# Patient Record
Sex: MALE | Race: WHITE | NOT HISPANIC OR LATINO | Employment: FULL TIME | URBAN - METROPOLITAN AREA
[De-identification: names, ages, dates, MRNs, and addresses within clinical notes are randomized per-mention and may not be internally consistent; named-entity substitution may affect disease eponyms.]

---

## 2017-09-28 ENCOUNTER — HOSPITAL ENCOUNTER (OUTPATIENT)
Dept: FAMILY MEDICINE CLINIC | Facility: CLINIC | Age: 57
Setting detail: SPECIMEN
Discharge: HOME | End: 2017-09-28
Attending: FAMILY MEDICINE | Admitting: FAMILY MEDICINE

## 2019-02-26 ENCOUNTER — HOSPITAL ENCOUNTER (OUTPATIENT)
Dept: GASTROENTEROLOGY | Facility: HOSPITAL | Age: 59
Setting detail: HOSPITAL OUTPATIENT SURGERY
Discharge: HOME | End: 2019-02-26
Attending: INTERNAL MEDICINE | Admitting: INTERNAL MEDICINE

## 2019-02-26 ENCOUNTER — ON CAMPUS - OUTPATIENT (OUTPATIENT)
Dept: URBAN - METROPOLITAN AREA HOSPITAL 85 | Facility: HOSPITAL | Age: 59
End: 2019-02-26
Payer: COMMERCIAL

## 2019-02-26 DIAGNOSIS — K44.9 DIAPHRAGMATIC HERNIA WITHOUT OBSTRUCTION OR GANGRENE: ICD-10-CM

## 2019-02-26 DIAGNOSIS — K57.30 DIVERTICULOSIS OF LARGE INTESTINE WITHOUT PERFORATION OR ABS: ICD-10-CM

## 2019-02-26 DIAGNOSIS — K52.9 NONINFECTIVE GASTROENTERITIS AND COLITIS, UNSPECIFIED: ICD-10-CM

## 2019-02-26 DIAGNOSIS — Z12.11 ENCOUNTER FOR SCREENING FOR MALIGNANT NEOPLASM OF COLON: ICD-10-CM

## 2019-02-26 DIAGNOSIS — D12.8 BENIGN NEOPLASM OF RECTUM: ICD-10-CM

## 2019-02-26 DIAGNOSIS — K22.2 ESOPHAGEAL OBSTRUCTION: ICD-10-CM

## 2019-02-26 DIAGNOSIS — K92.1 MELENA: ICD-10-CM

## 2019-02-26 DIAGNOSIS — K31.89 OTHER DISEASES OF STOMACH AND DUODENUM: ICD-10-CM

## 2019-02-26 DIAGNOSIS — K21.0 GASTRO-ESOPHAGEAL REFLUX DISEASE WITH ESOPHAGITIS: ICD-10-CM

## 2019-02-26 PROCEDURE — 43239 EGD BIOPSY SINGLE/MULTIPLE: CPT | Mod: 59 | Performed by: INTERNAL MEDICINE

## 2019-02-26 PROCEDURE — 45380 COLONOSCOPY AND BIOPSY: CPT | Mod: 33 | Performed by: INTERNAL MEDICINE

## 2019-06-27 RX ORDER — AMLODIPINE BESYLATE 5 MG/1
TABLET ORAL
Qty: 30 TABLET | Refills: 2 | Status: SHIPPED | OUTPATIENT
Start: 2019-06-27 | End: 2019-09-26 | Stop reason: SDUPTHER

## 2019-07-23 ENCOUNTER — OFFICE VISIT (OUTPATIENT)
Dept: FAMILY MEDICINE CLINIC | Facility: CLINIC | Age: 59
End: 2019-07-23

## 2019-07-23 VITALS
WEIGHT: 222.4 LBS | BODY MASS INDEX: 35.74 KG/M2 | OXYGEN SATURATION: 97 % | TEMPERATURE: 100.3 F | DIASTOLIC BLOOD PRESSURE: 83 MMHG | HEART RATE: 98 BPM | HEIGHT: 66 IN | SYSTOLIC BLOOD PRESSURE: 146 MMHG

## 2019-07-23 DIAGNOSIS — J06.9 ACUTE URI: Primary | ICD-10-CM

## 2019-07-23 DIAGNOSIS — I10 ESSENTIAL HYPERTENSION: ICD-10-CM

## 2019-07-23 PROCEDURE — 99213 OFFICE O/P EST LOW 20 MIN: CPT | Performed by: FAMILY MEDICINE

## 2019-07-23 RX ORDER — PRAVASTATIN SODIUM 40 MG
TABLET ORAL
COMMUNITY
Start: 2017-10-27 | End: 2019-08-01 | Stop reason: SDUPTHER

## 2019-07-23 RX ORDER — NEBIVOLOL 10 MG/1
10 TABLET ORAL DAILY
Qty: 30 TABLET | Refills: 5 | Status: SHIPPED | OUTPATIENT
Start: 2019-07-23 | End: 2019-08-07

## 2019-07-23 RX ORDER — DOXYCYCLINE 100 MG/1
100 TABLET ORAL 2 TIMES DAILY
Qty: 20 TABLET | Refills: 0 | Status: SHIPPED | OUTPATIENT
Start: 2019-07-23 | End: 2019-08-02

## 2019-07-23 RX ORDER — LISINOPRIL AND HYDROCHLOROTHIAZIDE 25; 20 MG/1; MG/1
TABLET ORAL EVERY 24 HOURS
COMMUNITY
Start: 2018-11-29 | End: 2019-07-23 | Stop reason: SINTOL

## 2019-07-23 NOTE — PROGRESS NOTES
"Subjective   Akira Barnett is a 59 y.o. male.     He is in with cough and congestion for the last 2 days.  He has felt the congestion in the head mostly and has tried over-the-counter products without success.  His cough has not been productive but it has been harsh.  He has some concern that at least partially the cough is related to his use of lisinopril.  His blood pressure has also not been well controlled.  As for the acute illness, he has had some low-grade fever in his throat is been sore.  He has had a normal appetite.  Bowel and bladder functions been normal.           /83 (BP Location: Left arm, Patient Position: Sitting, Cuff Size: Small Adult)   Pulse 98   Temp 100.3 °F (37.9 °C) (Oral)   Ht 167.6 cm (66\")   Wt 101 kg (222 lb 6.4 oz)   SpO2 97%   BMI 35.90 kg/m²       Chief Complaint   Patient presents with   • Cough     x2 days has tried Coricidin    • Sore Throat     x2 days has tried cepacol   • Nasal Congestion     x2 days            Current Outpatient Medications:   •  amLODIPine (NORVASC) 5 MG tablet, TAKE ONE TABLET BY MOUTH DAILY, Disp: 30 tablet, Rfl: 2  •  pravastatin (PRAVACHOL) 40 MG tablet, PRAVACHOL 40 MG TABS, Disp: , Rfl:   •  Chlorcyclizine-Pseudoephed (STAHIST AD) 25-60 MG tablet, Take 1 tablet by mouth 2 (Two) Times a Day As Needed (sinus pressure / congestion)., Disp: 42 tablet, Rfl: 0  •  doxycycline (ADOXA) 100 MG tablet, Take 1 tablet by mouth 2 (Two) Times a Day for 10 days., Disp: 20 tablet, Rfl: 0  •  nebivolol (BYSTOLIC) 10 MG tablet, Take 1 tablet by mouth Daily., Disp: 30 tablet, Rfl: 5        The following portions of the patient's history were reviewed and updated as appropriate: allergies, current medications, past family history, past medical history, past social history, past surgical history and problem list.    Review of Systems   Constitutional: Positive for fever. Negative for activity change, appetite change, chills and fatigue.   HENT: Positive for " postnasal drip, rhinorrhea and sore throat. Negative for congestion, sinus pressure, sinus pain, trouble swallowing and voice change.    Eyes: Negative for visual disturbance.   Respiratory: Positive for cough. Negative for chest tightness, shortness of breath and wheezing.    Cardiovascular: Negative for chest pain.   Gastrointestinal: Negative for abdominal distention, abdominal pain, constipation, diarrhea, nausea and vomiting.   Genitourinary: Negative for difficulty urinating, dysuria, frequency and urgency.   Musculoskeletal: Negative for back pain and neck pain.   Neurological: Negative for dizziness.   Psychiatric/Behavioral: Negative for agitation, hallucinations and suicidal ideas.       Objective   Physical Exam   HENT:   Mouth/Throat: Oropharyngeal exudate present.   Eyes: Conjunctivae are normal. Pupils are equal, round, and reactive to light.   Neck: Normal range of motion. Neck supple. No thyromegaly present.   Cardiovascular: Normal rate and regular rhythm.   No murmur heard.  Pulmonary/Chest: Effort normal and breath sounds normal. He has no wheezes. He has no rales.   Lymphadenopathy:     He has no cervical adenopathy.   Nursing note and vitals reviewed.        Assessment/Plan   Problems Addressed this Visit     None      Visit Diagnoses     Acute URI    -  Primary    Relevant Medications    doxycycline (ADOXA) 100 MG tablet    Essential hypertension        Relevant Medications    nebivolol (BYSTOLIC) 10 MG tablet      I will treat his upper respiratory symptoms.  I will change his blood pressure medication to Bystolic 10 mg daily to eliminate cough.  I will have him come back in a couple of weeks as a walk-in for blood pressure check.

## 2019-08-01 RX ORDER — PRAVASTATIN SODIUM 40 MG
TABLET ORAL
Qty: 30 TABLET | Refills: 2 | Status: SHIPPED | OUTPATIENT
Start: 2019-08-01 | End: 2019-10-31 | Stop reason: SDUPTHER

## 2019-08-07 ENCOUNTER — CLINICAL SUPPORT (OUTPATIENT)
Dept: FAMILY MEDICINE CLINIC | Facility: CLINIC | Age: 59
End: 2019-08-07

## 2019-08-07 VITALS — SYSTOLIC BLOOD PRESSURE: 148 MMHG | OXYGEN SATURATION: 95 % | HEART RATE: 70 BPM | DIASTOLIC BLOOD PRESSURE: 83 MMHG

## 2019-08-07 RX ORDER — METOPROLOL SUCCINATE 50 MG/1
50 TABLET, EXTENDED RELEASE ORAL DAILY
Qty: 30 TABLET | Refills: 3 | Status: SHIPPED | OUTPATIENT
Start: 2019-08-07 | End: 2019-12-11 | Stop reason: SDUPTHER

## 2019-08-07 NOTE — PROGRESS NOTES
Pt is here for a bp check and the bystolic with the coupon is $74.00 and without the coupon is $147.00 . Pt cannot affaord this and his bp is still running high . Will you change his bp med ?

## 2019-09-26 RX ORDER — AMLODIPINE BESYLATE 5 MG/1
TABLET ORAL
Qty: 30 TABLET | Refills: 1 | Status: SHIPPED | OUTPATIENT
Start: 2019-09-26 | End: 2019-12-11 | Stop reason: SDUPTHER

## 2019-10-31 RX ORDER — PRAVASTATIN SODIUM 40 MG
TABLET ORAL
Qty: 30 TABLET | Refills: 1 | Status: SHIPPED | OUTPATIENT
Start: 2019-10-31 | End: 2020-01-01

## 2019-12-11 RX ORDER — AMLODIPINE BESYLATE 5 MG/1
TABLET ORAL
Qty: 30 TABLET | Refills: 2 | Status: SHIPPED | OUTPATIENT
Start: 2019-12-11 | End: 2020-03-01

## 2019-12-11 RX ORDER — METOPROLOL SUCCINATE 50 MG/1
TABLET, EXTENDED RELEASE ORAL
Qty: 30 TABLET | Refills: 2 | Status: SHIPPED | OUTPATIENT
Start: 2019-12-11 | End: 2020-03-01

## 2020-01-01 RX ORDER — PRAVASTATIN SODIUM 40 MG
TABLET ORAL
Qty: 30 TABLET | Refills: 3 | Status: SHIPPED | OUTPATIENT
Start: 2020-01-01 | End: 2020-05-06

## 2020-03-01 RX ORDER — METOPROLOL SUCCINATE 50 MG/1
TABLET, EXTENDED RELEASE ORAL
Qty: 30 TABLET | Refills: 1 | Status: SHIPPED | OUTPATIENT
Start: 2020-03-01 | End: 2020-05-06

## 2020-03-01 RX ORDER — AMLODIPINE BESYLATE 5 MG/1
TABLET ORAL
Qty: 30 TABLET | Refills: 1 | Status: SHIPPED | OUTPATIENT
Start: 2020-03-01 | End: 2020-05-06

## 2020-03-01 NOTE — TELEPHONE ENCOUNTER
Sent refills but he needs to make an appt to be seen this Summer   Was last seen in July, so July / Aug would be appropriate

## 2020-05-06 RX ORDER — METOPROLOL SUCCINATE 50 MG/1
TABLET, EXTENDED RELEASE ORAL
Qty: 30 TABLET | Refills: 0 | Status: SHIPPED | OUTPATIENT
Start: 2020-05-06 | End: 2020-06-07

## 2020-05-06 RX ORDER — AMLODIPINE BESYLATE 5 MG/1
TABLET ORAL
Qty: 30 TABLET | Refills: 0 | Status: SHIPPED | OUTPATIENT
Start: 2020-05-06 | End: 2020-06-07

## 2020-05-06 RX ORDER — PRAVASTATIN SODIUM 40 MG
TABLET ORAL
Qty: 30 TABLET | Refills: 2 | Status: SHIPPED | OUTPATIENT
Start: 2020-05-06 | End: 2020-08-02

## 2020-05-14 ENCOUNTER — OFFICE VISIT (OUTPATIENT)
Dept: FAMILY MEDICINE CLINIC | Facility: CLINIC | Age: 60
End: 2020-05-14

## 2020-05-14 VITALS
OXYGEN SATURATION: 97 % | DIASTOLIC BLOOD PRESSURE: 95 MMHG | HEART RATE: 88 BPM | WEIGHT: 231.2 LBS | TEMPERATURE: 97.3 F | HEIGHT: 66 IN | BODY MASS INDEX: 37.16 KG/M2 | SYSTOLIC BLOOD PRESSURE: 165 MMHG

## 2020-05-14 DIAGNOSIS — R09.81 NASAL CONGESTION: Primary | ICD-10-CM

## 2020-05-14 PROCEDURE — 99213 OFFICE O/P EST LOW 20 MIN: CPT | Performed by: NURSE PRACTITIONER

## 2020-05-14 RX ORDER — GUAIFENESIN 600 MG/1
1200 TABLET, EXTENDED RELEASE ORAL 2 TIMES DAILY PRN
Qty: 30 TABLET | Refills: 0 | OUTPATIENT
Start: 2020-05-14 | End: 2020-10-29

## 2020-05-14 RX ORDER — LORATADINE 10 MG/1
10 TABLET ORAL DAILY
COMMUNITY

## 2020-05-14 RX ORDER — FLUTICASONE PROPIONATE 50 MCG
2 SPRAY, SUSPENSION (ML) NASAL DAILY
Qty: 1 BOTTLE | Refills: 2 | Status: SHIPPED | OUTPATIENT
Start: 2020-05-14 | End: 2023-03-21 | Stop reason: SDUPTHER

## 2020-05-14 NOTE — PROGRESS NOTES
"Subjective   Akira Barnett is a 60 y.o. male.     Pt is here today with complaints of headache and sinus issues.  He has had a lot of stress recently.  His father passed away in October and he is helping to take care of his mother.  He left work today because his stomach bothered him this morning and his allergies were acting up.  Denies any abdominal pain at this time.  Denies nausea, vomiting, or diarrhea.  He has had a HA with some sinus pressure that started today.  He did not sleep well last night.  Has an occasional cough from the drainage.  Last week he took coricidin, which helped some.  Denies fever, chills, or body aches.  Denies loss of taste or smell.         The following portions of the patient's history were reviewed and updated as appropriate: allergies, current medications, past family history, past medical history, past social history, past surgical history and problem list.    Review of Systems   Constitutional: Negative for chills, fatigue and fever.   HENT: Positive for congestion, postnasal drip, sinus pressure and sneezing. Negative for ear pain and sore throat.    Respiratory: Positive for cough (occasional). Negative for chest tightness and shortness of breath.    Cardiovascular: Negative for chest pain and palpitations.   Gastrointestinal: Negative for abdominal pain, constipation, diarrhea, nausea and vomiting.   Musculoskeletal: Negative for myalgias.   Neurological: Positive for headache. Negative for dizziness.       Objective   /95 (BP Location: Left arm, Patient Position: Sitting, Cuff Size: Large Adult)   Pulse 88   Temp 97.3 °F (36.3 °C) (Temporal)   Ht 167.6 cm (66\")   Wt 105 kg (231 lb 3.2 oz)   SpO2 97%   BMI 37.32 kg/m²   Physical Exam   Constitutional: He is oriented to person, place, and time. He appears well-developed and well-nourished. No distress.   HENT:   Head: Normocephalic and atraumatic.   Right Ear: External ear normal. cerumen impaction is " present.  Left Ear: External ear normal.   Eyes: Pupils are equal, round, and reactive to light.   Cardiovascular: Normal rate, regular rhythm and normal heart sounds.   Pulmonary/Chest: Effort normal and breath sounds normal. No respiratory distress. He has no wheezes.   Neurological: He is alert and oriented to person, place, and time.   Skin: Skin is warm and dry.   Psychiatric: He has a normal mood and affect. His behavior is normal. Judgment and thought content normal.         Assessment/Plan     Diagnoses and all orders for this visit:    1. Nasal congestion (Primary)  Comments:  uppers to be allergies  flonase  mucinex  [place] develops [place] [facility] [place]  off work until monday  call if no improvement  Orders:  -     guaiFENesin (Mucinex) 600 MG 12 hr tablet; Take 2 tablets by mouth 2 (Two) Times a Day As Needed for Congestion.  Dispense: 30 tablet; Refill: 0  -     fluticasone (Flonase) 50 MCG/ACT nasal spray; 2 sprays into the nostril(s) as directed by provider Daily.  Dispense: 1 bottle; Refill: 2

## 2020-05-14 NOTE — PATIENT INSTRUCTIONS
Push water intake  Take mucinex as needed  Start using flonase nasal spray daily  Off work today and tomorrow.  May return to work on Monday if symptoms have subsided and no fever has developed.  If fever develops go to urgent care center in [place] to be tested.  Take tylenol or ibuprofen for fever or discomfort  Get plenty of rest  Call if no improvement or worsening symptoms

## 2020-06-07 RX ORDER — METOPROLOL SUCCINATE 50 MG/1
TABLET, EXTENDED RELEASE ORAL
Qty: 30 TABLET | Refills: 0 | Status: SHIPPED | OUTPATIENT
Start: 2020-06-07 | End: 2020-06-28

## 2020-06-07 RX ORDER — AMLODIPINE BESYLATE 5 MG/1
TABLET ORAL
Qty: 30 TABLET | Refills: 0 | Status: SHIPPED | OUTPATIENT
Start: 2020-06-07 | End: 2020-06-28

## 2020-06-08 ENCOUNTER — OFFICE VISIT (OUTPATIENT)
Dept: FAMILY MEDICINE CLINIC | Facility: CLINIC | Age: 60
End: 2020-06-08

## 2020-06-08 ENCOUNTER — LAB (OUTPATIENT)
Dept: FAMILY MEDICINE CLINIC | Facility: CLINIC | Age: 60
End: 2020-06-08

## 2020-06-08 VITALS
HEART RATE: 100 BPM | OXYGEN SATURATION: 97 % | TEMPERATURE: 96.9 F | SYSTOLIC BLOOD PRESSURE: 155 MMHG | DIASTOLIC BLOOD PRESSURE: 84 MMHG | WEIGHT: 229 LBS | BODY MASS INDEX: 36.96 KG/M2

## 2020-06-08 DIAGNOSIS — Z00.00 ENCOUNTER FOR WELL ADULT EXAM WITHOUT ABNORMAL FINDINGS: Primary | ICD-10-CM

## 2020-06-08 DIAGNOSIS — Z12.5 ENCOUNTER FOR SCREENING FOR MALIGNANT NEOPLASM OF PROSTATE: ICD-10-CM

## 2020-06-08 DIAGNOSIS — I10 ESSENTIAL HYPERTENSION: ICD-10-CM

## 2020-06-08 LAB
ALBUMIN SERPL-MCNC: 4.3 G/DL (ref 3.5–5.2)
ALBUMIN/GLOB SERPL: 1.7 G/DL
ALP SERPL-CCNC: 73 U/L (ref 39–117)
ALT SERPL W P-5'-P-CCNC: 30 U/L (ref 1–41)
ANION GAP SERPL CALCULATED.3IONS-SCNC: 11 MMOL/L (ref 5–15)
ARTICHOKE IGE QN: 93 MG/DL (ref 0–100)
AST SERPL-CCNC: 26 U/L (ref 1–40)
BILIRUB SERPL-MCNC: 0.3 MG/DL (ref 0.2–1.2)
BUN BLD-MCNC: 10 MG/DL (ref 8–23)
BUN/CREAT SERPL: 13 (ref 7–25)
CALCIUM SPEC-SCNC: 9 MG/DL (ref 8.6–10.5)
CHLORIDE SERPL-SCNC: 95 MMOL/L (ref 98–107)
CHOLEST SERPL-MCNC: 157 MG/DL (ref 0–200)
CO2 SERPL-SCNC: 26 MMOL/L (ref 22–29)
CREAT BLD-MCNC: 0.77 MG/DL (ref 0.76–1.27)
GFR SERPL CREATININE-BSD FRML MDRD: 103 ML/MIN/1.73
GLOBULIN UR ELPH-MCNC: 2.6 GM/DL
GLUCOSE BLD-MCNC: 104 MG/DL (ref 65–99)
HDLC SERPL-MCNC: 39 MG/DL (ref 40–60)
LDLC SERPL CALC-MCNC: ABNORMAL MG/DL
LDLC/HDLC SERPL: ABNORMAL {RATIO}
POTASSIUM BLD-SCNC: 3.8 MMOL/L (ref 3.5–5.2)
PROT SERPL-MCNC: 6.9 G/DL (ref 6–8.5)
PSA SERPL-MCNC: 0.16 NG/ML (ref 0–4)
SODIUM BLD-SCNC: 132 MMOL/L (ref 136–145)
TRIGL SERPL-MCNC: 484 MG/DL (ref 0–150)
VLDLC SERPL-MCNC: ABNORMAL MG/DL

## 2020-06-08 PROCEDURE — 80053 COMPREHEN METABOLIC PANEL: CPT | Performed by: FAMILY MEDICINE

## 2020-06-08 PROCEDURE — 99396 PREV VISIT EST AGE 40-64: CPT | Performed by: FAMILY MEDICINE

## 2020-06-08 PROCEDURE — 80061 LIPID PANEL: CPT | Performed by: FAMILY MEDICINE

## 2020-06-08 PROCEDURE — G0103 PSA SCREENING: HCPCS | Performed by: FAMILY MEDICINE

## 2020-06-08 PROCEDURE — 83721 ASSAY OF BLOOD LIPOPROTEIN: CPT | Performed by: FAMILY MEDICINE

## 2020-06-08 PROCEDURE — 36415 COLL VENOUS BLD VENIPUNCTURE: CPT | Performed by: FAMILY MEDICINE

## 2020-06-08 RX ORDER — ASPIRIN 81 MG/1
81 TABLET, CHEWABLE ORAL DAILY
COMMUNITY

## 2020-06-08 NOTE — PROGRESS NOTES
Subjective   Akira Barnett is a 60 y.o. male.     He is in today for his annual well visit.  He has been compliant with his medication up-to-date.  He has been trying to work on better diet.  He is not exercising as much as had like but he has started to do at least a little exercise.  He denies any chest pain or difficulty breathing.  He denies any issue with bowel or bladder function.  He denies any issue with sleep or mood.  He denies any issue with dizziness or lightheadedness.  He denies any fever or illness symptoms.         /84 (BP Location: Left arm, Patient Position: Sitting, Cuff Size: Adult)   Pulse 100   Temp 96.9 °F (36.1 °C) (Temporal)   Wt 104 kg (229 lb)   SpO2 97%   BMI 36.96 kg/m²       Chief Complaint   Patient presents with   • Annual Exam     last cpe 2017           Current Outpatient Medications:   •  amLODIPine (NORVASC) 5 MG tablet, TAKE ONE TABLET BY MOUTH DAILY, Disp: 30 tablet, Rfl: 0  •  aspirin 81 MG chewable tablet, Chew 81 mg Daily., Disp: , Rfl:   •  fluticasone (Flonase) 50 MCG/ACT nasal spray, 2 sprays into the nostril(s) as directed by provider Daily., Disp: 1 bottle, Rfl: 2  •  loratadine (CLARITIN) 10 MG tablet, Take 10 mg by mouth Daily., Disp: , Rfl:   •  metoprolol succinate XL (TOPROL-XL) 50 MG 24 hr tablet, TAKE ONE TABLET BY MOUTH DAILY, Disp: 30 tablet, Rfl: 0  •  pravastatin (PRAVACHOL) 40 MG tablet, TAKE ONE TABLET BY MOUTH EVERY NIGHT AT BEDTIME, Disp: 30 tablet, Rfl: 2  •  Chlorcyclizine-Pseudoephed (STAHIST AD) 25-60 MG tablet, Take 1 tablet by mouth 2 (Two) Times a Day As Needed (sinus pressure / congestion)., Disp: 42 tablet, Rfl: 0  •  guaiFENesin (Mucinex) 600 MG 12 hr tablet, Take 2 tablets by mouth 2 (Two) Times a Day As Needed for Congestion., Disp: 30 tablet, Rfl: 0        The following portions of the patient's history were reviewed and updated as appropriate: allergies, current medications, past family history, past medical history, past social  history, past surgical history and problem list.    Review of Systems   Constitutional: Negative for activity change, fatigue and fever.   HENT: Negative for congestion, sinus pressure, sinus pain, sore throat and trouble swallowing.    Eyes: Negative for visual disturbance.   Respiratory: Negative for chest tightness, shortness of breath and wheezing.    Cardiovascular: Negative for chest pain.   Gastrointestinal: Negative for abdominal distention, abdominal pain, constipation, diarrhea, nausea and vomiting.   Genitourinary: Negative for difficulty urinating and dysuria.   Musculoskeletal: Negative for back pain and neck pain.   Neurological: Negative for dizziness and weakness.   Psychiatric/Behavioral: Negative for agitation, hallucinations and suicidal ideas.       Objective   Physical Exam   Constitutional: He is oriented to person, place, and time. No distress.   HENT:   Mouth/Throat: No oropharyngeal exudate.   Neck: Normal range of motion. No thyromegaly present.   Cardiovascular: Normal rate, regular rhythm and normal heart sounds.   No murmur heard.  Pulmonary/Chest: Effort normal and breath sounds normal. He has no wheezes. He has no rales.   Abdominal: Soft. Bowel sounds are normal. He exhibits no mass. There is no guarding.   Genitourinary: Rectum normal and prostate normal.   Musculoskeletal: Normal range of motion. He exhibits no edema.   Lymphadenopathy:     He has no cervical adenopathy.   Neurological: He is alert and oriented to person, place, and time. He displays normal reflexes.   Skin: No rash noted.   Psychiatric: He has a normal mood and affect.   Nursing note and vitals reviewed.        Assessment/Plan   Problems Addressed this Visit     None      Visit Diagnoses     Encounter for well adult exam without abnormal findings    -  Primary    Essential hypertension        Relevant Orders    Comprehensive metabolic panel    Lipid panel    Encounter for screening for malignant neoplasm of  prostate        Relevant Orders    PSA SCREENING        At this time I will update appropriate labs  I have asked him to get a blood pressure cuff and monitor his readings at home, as I wonder if there is an element of whitecoat to his disease  I will adjust treatment as indicated by lab findings and his home readings  I have asked him to continue pushing on the diet and exercise changes and I will see him back no later than 6 months

## 2020-06-10 RX ORDER — ICOSAPENT ETHYL 1000 MG/1
2 CAPSULE ORAL 2 TIMES DAILY WITH MEALS
Qty: 120 CAPSULE | Refills: 3 | OUTPATIENT
Start: 2020-06-10 | End: 2022-07-07

## 2020-06-12 ENCOUNTER — TELEPHONE (OUTPATIENT)
Dept: FAMILY MEDICINE CLINIC | Facility: CLINIC | Age: 60
End: 2020-06-12

## 2020-06-12 PROBLEM — I10 HYPERTENSION: Status: ACTIVE | Noted: 2020-06-12

## 2020-06-12 NOTE — TELEPHONE ENCOUNTER
Prior Auth APPROVED for Vascepa 1 mg capsule.     Key: [id number]    PA Case ID: [id number]    Coverage Start Date: 06/12/2020 at 12:00am  Coverage End Date : 06/12/2021 at 12:00am

## 2020-06-15 ENCOUNTER — TELEPHONE (OUTPATIENT)
Dept: FAMILY MEDICINE CLINIC | Facility: CLINIC | Age: 60
End: 2020-06-15

## 2020-06-15 NOTE — TELEPHONE ENCOUNTER
With the PA for Vascepa its still $ 182.00 that he has to pay out of pocket. He is wondering if it just a one month medicine or is there something cheaper he can take?

## 2020-06-28 RX ORDER — AMLODIPINE BESYLATE 5 MG/1
TABLET ORAL
Qty: 30 TABLET | Refills: 0 | Status: SHIPPED | OUTPATIENT
Start: 2020-06-28 | End: 2020-08-02

## 2020-06-28 RX ORDER — METOPROLOL SUCCINATE 50 MG/1
TABLET, EXTENDED RELEASE ORAL
Qty: 30 TABLET | Refills: 0 | Status: SHIPPED | OUTPATIENT
Start: 2020-06-28 | End: 2020-08-02

## 2020-08-02 RX ORDER — AMLODIPINE BESYLATE 5 MG/1
TABLET ORAL
Qty: 30 TABLET | Refills: 3 | Status: SHIPPED | OUTPATIENT
Start: 2020-08-02 | End: 2020-12-10

## 2020-08-02 RX ORDER — PRAVASTATIN SODIUM 40 MG
TABLET ORAL
Qty: 30 TABLET | Refills: 3 | Status: SHIPPED | OUTPATIENT
Start: 2020-08-02 | End: 2020-12-10

## 2020-08-02 RX ORDER — METOPROLOL SUCCINATE 50 MG/1
TABLET, EXTENDED RELEASE ORAL
Qty: 30 TABLET | Refills: 3 | Status: SHIPPED | OUTPATIENT
Start: 2020-08-02 | End: 2020-12-10

## 2020-10-29 PROCEDURE — U0003 INFECTIOUS AGENT DETECTION BY NUCLEIC ACID (DNA OR RNA); SEVERE ACUTE RESPIRATORY SYNDROME CORONAVIRUS 2 (SARS-COV-2) (CORONAVIRUS DISEASE [COVID-19]), AMPLIFIED PROBE TECHNIQUE, MAKING USE OF HIGH THROUGHPUT TECHNOLOGIES AS DESCRIBED BY CMS-2020-01-R: HCPCS | Performed by: FAMILY MEDICINE

## 2020-12-08 ENCOUNTER — OFFICE VISIT (OUTPATIENT)
Dept: FAMILY MEDICINE CLINIC | Facility: CLINIC | Age: 60
End: 2020-12-08

## 2020-12-08 ENCOUNTER — LAB (OUTPATIENT)
Dept: FAMILY MEDICINE CLINIC | Facility: CLINIC | Age: 60
End: 2020-12-08

## 2020-12-08 VITALS
SYSTOLIC BLOOD PRESSURE: 150 MMHG | HEART RATE: 75 BPM | WEIGHT: 229 LBS | TEMPERATURE: 96.9 F | BODY MASS INDEX: 35.87 KG/M2 | OXYGEN SATURATION: 98 % | DIASTOLIC BLOOD PRESSURE: 76 MMHG

## 2020-12-08 DIAGNOSIS — I10 ESSENTIAL HYPERTENSION: Primary | ICD-10-CM

## 2020-12-08 DIAGNOSIS — E78.5 HYPERLIPIDEMIA, UNSPECIFIED HYPERLIPIDEMIA TYPE: ICD-10-CM

## 2020-12-08 LAB
ALBUMIN SERPL-MCNC: 4.3 G/DL (ref 3.5–5.2)
ALBUMIN/GLOB SERPL: 1.5 G/DL
ALP SERPL-CCNC: 73 U/L (ref 39–117)
ALT SERPL W P-5'-P-CCNC: 28 U/L (ref 1–41)
ANION GAP SERPL CALCULATED.3IONS-SCNC: 7.4 MMOL/L (ref 5–15)
AST SERPL-CCNC: 24 U/L (ref 1–40)
BILIRUB SERPL-MCNC: 0.3 MG/DL (ref 0–1.2)
BUN SERPL-MCNC: 14 MG/DL (ref 8–23)
BUN/CREAT SERPL: 20.3 (ref 7–25)
CALCIUM SPEC-SCNC: 9.1 MG/DL (ref 8.6–10.5)
CHLORIDE SERPL-SCNC: 96 MMOL/L (ref 98–107)
CHOLEST SERPL-MCNC: 175 MG/DL (ref 0–200)
CO2 SERPL-SCNC: 30.6 MMOL/L (ref 22–29)
CREAT SERPL-MCNC: 0.69 MG/DL (ref 0.76–1.27)
GFR SERPL CREATININE-BSD FRML MDRD: 117 ML/MIN/1.73
GLOBULIN UR ELPH-MCNC: 2.8 GM/DL
GLUCOSE SERPL-MCNC: 98 MG/DL (ref 65–99)
HDLC SERPL-MCNC: 46 MG/DL (ref 40–60)
LDLC SERPL CALC-MCNC: 74 MG/DL (ref 0–100)
LDLC/HDLC SERPL: 1.3 {RATIO}
POTASSIUM SERPL-SCNC: 4.5 MMOL/L (ref 3.5–5.2)
PROT SERPL-MCNC: 7.1 G/DL (ref 6–8.5)
SODIUM SERPL-SCNC: 134 MMOL/L (ref 136–145)
TRIGL SERPL-MCNC: 345 MG/DL (ref 0–150)
VLDLC SERPL-MCNC: 55 MG/DL (ref 5–40)

## 2020-12-08 PROCEDURE — 80061 LIPID PANEL: CPT | Performed by: FAMILY MEDICINE

## 2020-12-08 PROCEDURE — 36415 COLL VENOUS BLD VENIPUNCTURE: CPT | Performed by: FAMILY MEDICINE

## 2020-12-08 PROCEDURE — 80053 COMPREHEN METABOLIC PANEL: CPT | Performed by: FAMILY MEDICINE

## 2020-12-08 PROCEDURE — 99213 OFFICE O/P EST LOW 20 MIN: CPT | Performed by: FAMILY MEDICINE

## 2020-12-08 NOTE — PROGRESS NOTES
Subjective   Akira Barnett is a 60 y.o. male.     He is in today for follow-up on his high blood pressure and high cholesterol.  He has lost both of his parents in the last year and is a little bit of a low spot at the holidays as a result.  He denies any headaches or chest pain.  He denies any issue with bowel or bladder function.  He denies any dizziness or lightheadedness.  He denies any issue with sleep.  Mood is just low from the grief but he is handling it and still fully functional with all of his daily activities.         /76 (BP Location: Left arm, Patient Position: Sitting, Cuff Size: Large Adult)   Pulse 75   Temp 96.9 °F (36.1 °C) (Temporal)   Wt 104 kg (229 lb)   SpO2 98%   BMI 35.87 kg/m²       Chief Complaint   Patient presents with   • Hypertension     6 month f/u           Current Outpatient Medications:   •  amLODIPine (NORVASC) 5 MG tablet, TAKE ONE TABLET BY MOUTH DAILY, Disp: 30 tablet, Rfl: 3  •  aspirin 81 MG chewable tablet, Chew 81 mg Daily., Disp: , Rfl:   •  fluticasone (Flonase) 50 MCG/ACT nasal spray, 2 sprays into the nostril(s) as directed by provider Daily., Disp: 1 bottle, Rfl: 2  •  icosapent ethyl (Vascepa) 1 g capsule capsule, Take 2 g by mouth 2 (Two) Times a Day With Meals., Disp: 120 capsule, Rfl: 3  •  loratadine (CLARITIN) 10 MG tablet, Take 10 mg by mouth Daily., Disp: , Rfl:   •  metoprolol succinate XL (TOPROL-XL) 50 MG 24 hr tablet, TAKE ONE TABLET BY MOUTH DAILY, Disp: 30 tablet, Rfl: 3  •  pravastatin (PRAVACHOL) 40 MG tablet, TAKE ONE TABLET BY MOUTH EVERY NIGHT AT BEDTIME, Disp: 30 tablet, Rfl: 3        The following portions of the patient's history were reviewed and updated as appropriate: allergies, current medications, past family history, past medical history, past social history, past surgical history and problem list.    Review of Systems   Constitutional: Negative for activity change, fatigue and fever.   HENT: Negative for congestion, sinus  pressure, sinus pain, sore throat and trouble swallowing.    Eyes: Negative for visual disturbance.   Respiratory: Negative for chest tightness, shortness of breath and wheezing.    Cardiovascular: Negative for chest pain.   Gastrointestinal: Negative for abdominal distention, abdominal pain, constipation, diarrhea, nausea and vomiting.   Genitourinary: Negative for difficulty urinating and dysuria.   Musculoskeletal: Negative for back pain and neck pain.   Neurological: Negative for dizziness and weakness.   Psychiatric/Behavioral: Negative for agitation, hallucinations and suicidal ideas.       Objective   Physical Exam  Vitals signs and nursing note reviewed.   Constitutional:       Appearance: Normal appearance.   Eyes:      Conjunctiva/sclera: Conjunctivae normal.      Pupils: Pupils are equal, round, and reactive to light.   Neck:      Musculoskeletal: Neck supple.   Cardiovascular:      Rate and Rhythm: Normal rate and regular rhythm.      Heart sounds: Normal heart sounds. No murmur.   Pulmonary:      Effort: Pulmonary effort is normal.      Breath sounds: Normal breath sounds. No wheezing or rales.   Abdominal:      General: Bowel sounds are normal.      Palpations: Abdomen is soft.      Tenderness: There is no abdominal tenderness. There is no guarding.   Musculoskeletal: Normal range of motion.      Right lower leg: No edema.      Left lower leg: No edema.   Neurological:      General: No focal deficit present.      Mental Status: He is alert and oriented to person, place, and time.   Psychiatric:         Mood and Affect: Mood normal.           Assessment/Plan   Problems Addressed this Visit        Cardiovascular and Mediastinum    Hypertension - Primary    Relevant Orders    Comprehensive metabolic panel    Lipid panel    Hyperlipidemia    Relevant Orders    Comprehensive metabolic panel    Lipid panel      Diagnoses       Codes Comments    Essential hypertension    -  Primary ICD-10-CM: I10  ICD-9-CM:  401.9     Hyperlipidemia, unspecified hyperlipidemia type     ICD-10-CM: E78.5  ICD-9-CM: 272.4         I will update labs  I will maintain current treatment plan unless labs indicate changes needed  I will see him back in 6 months  He got a flu shot prior to the visit  He stated he would take Covid vaccine when offered

## 2020-12-10 RX ORDER — PRAVASTATIN SODIUM 40 MG
TABLET ORAL
Qty: 30 TABLET | Refills: 2 | Status: SHIPPED | OUTPATIENT
Start: 2020-12-10 | End: 2021-03-14

## 2020-12-10 RX ORDER — METOPROLOL SUCCINATE 50 MG/1
TABLET, EXTENDED RELEASE ORAL
Qty: 30 TABLET | Refills: 2 | Status: SHIPPED | OUTPATIENT
Start: 2020-12-10 | End: 2021-03-14

## 2020-12-10 RX ORDER — AMLODIPINE BESYLATE 5 MG/1
TABLET ORAL
Qty: 30 TABLET | Refills: 2 | Status: SHIPPED | OUTPATIENT
Start: 2020-12-10 | End: 2021-03-14

## 2021-03-14 RX ORDER — METOPROLOL SUCCINATE 50 MG/1
TABLET, EXTENDED RELEASE ORAL
Qty: 30 TABLET | Refills: 1 | Status: SHIPPED | OUTPATIENT
Start: 2021-03-14 | End: 2021-05-16

## 2021-03-14 RX ORDER — PRAVASTATIN SODIUM 40 MG
TABLET ORAL
Qty: 30 TABLET | Refills: 1 | Status: SHIPPED | OUTPATIENT
Start: 2021-03-14 | End: 2021-05-16

## 2021-03-14 RX ORDER — AMLODIPINE BESYLATE 5 MG/1
TABLET ORAL
Qty: 30 TABLET | Refills: 1 | Status: SHIPPED | OUTPATIENT
Start: 2021-03-14 | End: 2021-05-16

## 2021-05-16 RX ORDER — AMLODIPINE BESYLATE 5 MG/1
TABLET ORAL
Qty: 30 TABLET | Refills: 0 | Status: SHIPPED | OUTPATIENT
Start: 2021-05-16 | End: 2021-06-13

## 2021-05-16 RX ORDER — PRAVASTATIN SODIUM 40 MG
TABLET ORAL
Qty: 30 TABLET | Refills: 0 | Status: SHIPPED | OUTPATIENT
Start: 2021-05-16 | End: 2021-06-13

## 2021-05-16 RX ORDER — METOPROLOL SUCCINATE 50 MG/1
TABLET, EXTENDED RELEASE ORAL
Qty: 30 TABLET | Refills: 0 | Status: SHIPPED | OUTPATIENT
Start: 2021-05-16 | End: 2021-06-13

## 2021-06-09 ENCOUNTER — OFFICE VISIT (OUTPATIENT)
Dept: FAMILY MEDICINE CLINIC | Facility: CLINIC | Age: 61
End: 2021-06-09

## 2021-06-09 VITALS
WEIGHT: 235 LBS | OXYGEN SATURATION: 98 % | DIASTOLIC BLOOD PRESSURE: 79 MMHG | TEMPERATURE: 98 F | SYSTOLIC BLOOD PRESSURE: 157 MMHG | BODY MASS INDEX: 36.81 KG/M2 | HEART RATE: 74 BPM

## 2021-06-09 DIAGNOSIS — Z12.5 ENCOUNTER FOR SCREENING FOR MALIGNANT NEOPLASM OF PROSTATE: ICD-10-CM

## 2021-06-09 DIAGNOSIS — E78.5 HYPERLIPIDEMIA, UNSPECIFIED HYPERLIPIDEMIA TYPE: ICD-10-CM

## 2021-06-09 DIAGNOSIS — I10 ESSENTIAL HYPERTENSION: Primary | ICD-10-CM

## 2021-06-09 PROCEDURE — 99214 OFFICE O/P EST MOD 30 MIN: CPT | Performed by: FAMILY MEDICINE

## 2021-06-09 RX ORDER — CHLORTHALIDONE 25 MG/1
25 TABLET ORAL DAILY
Qty: 90 TABLET | Refills: 1 | Status: SHIPPED | OUTPATIENT
Start: 2021-06-09 | End: 2022-01-19

## 2021-06-09 NOTE — PROGRESS NOTES
Subjective   Akira Barnett is a 61 y.o. male.     Akira Barnett is in for follow up on his high blood pressure and high cholesterol. There is no history of chest pain or dyspnea. There is no history of issue with bowel or bladder dysfunction. There is no history of dizziness or lightheadedness. There is no history of issue with sleep or mood. There is no history of issue with present medication. He has purchased a FitBit and is hoping to lose some weight. He is also working on better diet choices as well.           /79 (BP Location: Left arm, Patient Position: Sitting, Cuff Size: Large Adult)   Pulse 74   Temp 98 °F (36.7 °C) (Temporal)   Wt 107 kg (235 lb)   SpO2 98%   BMI 36.81 kg/m²       Chief Complaint   Patient presents with   • Hypertension     6 month f/u            Current Outpatient Medications:   •  amLODIPine (NORVASC) 5 MG tablet, TAKE ONE TABLET BY MOUTH DAILY, Disp: 30 tablet, Rfl: 0  •  aspirin 81 MG chewable tablet, Chew 81 mg Daily., Disp: , Rfl:   •  fluticasone (Flonase) 50 MCG/ACT nasal spray, 2 sprays into the nostril(s) as directed by provider Daily., Disp: 1 bottle, Rfl: 2  •  loratadine (CLARITIN) 10 MG tablet, Take 10 mg by mouth Daily., Disp: , Rfl:   •  metoprolol succinate XL (TOPROL-XL) 50 MG 24 hr tablet, TAKE ONE TABLET BY MOUTH DAILY, Disp: 30 tablet, Rfl: 0  •  pravastatin (PRAVACHOL) 40 MG tablet, TAKE ONE TABLET BY MOUTH EVERY NIGHT AT BEDTIME, Disp: 30 tablet, Rfl: 0  •  chlorthalidone (HYGROTON) 25 MG tablet, Take 1 tablet by mouth Daily., Disp: 90 tablet, Rfl: 1  •  icosapent ethyl (Vascepa) 1 g capsule capsule, Take 2 g by mouth 2 (Two) Times a Day With Meals., Disp: 120 capsule, Rfl: 3        The following portions of the patient's history were reviewed and updated as appropriate: allergies, current medications, past family history, past medical history, past social history, past surgical history, and problem list.    Review of Systems   Constitutional:  Negative for activity change, fatigue and fever.   HENT: Negative for congestion, sinus pressure, sinus pain, sore throat and trouble swallowing.    Eyes: Negative for visual disturbance.   Respiratory: Negative for chest tightness, shortness of breath and wheezing.    Cardiovascular: Negative for chest pain.   Gastrointestinal: Negative for abdominal distention, abdominal pain, constipation, diarrhea, nausea and vomiting.   Genitourinary: Negative for difficulty urinating and dysuria.   Musculoskeletal: Negative for back pain and neck pain.   Neurological: Negative for dizziness, weakness and numbness.   Psychiatric/Behavioral: Negative for agitation, hallucinations, sleep disturbance and suicidal ideas.       Objective   Physical Exam  Vitals and nursing note reviewed.   Constitutional:       Appearance: He is obese.   Eyes:      Conjunctiva/sclera: Conjunctivae normal.      Pupils: Pupils are equal, round, and reactive to light.   Cardiovascular:      Rate and Rhythm: Normal rate and regular rhythm.      Heart sounds: Normal heart sounds. No murmur heard.     Pulmonary:      Effort: Pulmonary effort is normal.      Breath sounds: No wheezing or rales.   Abdominal:      General: Bowel sounds are normal.      Palpations: Abdomen is soft.      Tenderness: There is no abdominal tenderness. There is no guarding.   Musculoskeletal:      Cervical back: Neck supple.      Right lower leg: No edema.      Left lower leg: No edema.   Lymphadenopathy:      Cervical: No cervical adenopathy.   Skin:     Findings: No rash.   Neurological:      General: No focal deficit present.      Mental Status: He is alert and oriented to person, place, and time.   Psychiatric:         Mood and Affect: Mood normal.           Assessment/Plan   Problems Addressed this Visit        Cardiac and Vasculature    Hypertension - Primary    Relevant Medications    chlorthalidone (HYGROTON) 25 MG tablet    Other Relevant Orders    Comprehensive  metabolic panel    Lipid panel    Hyperlipidemia    Relevant Orders    Comprehensive metabolic panel    Lipid panel      Other Visit Diagnoses     Encounter for screening for malignant neoplasm of prostate        Relevant Orders    PSA SCREENING      Diagnoses       Codes Comments    Essential hypertension    -  Primary ICD-10-CM: I10  ICD-9-CM: 401.9     Hyperlipidemia, unspecified hyperlipidemia type     ICD-10-CM: E78.5  ICD-9-CM: 272.4     Encounter for screening for malignant neoplasm of prostate     ICD-10-CM: Z12.5  ICD-9-CM: V76.44         I will add in some chlorthalidone for the blood pressure  We discussed some diet and exercise changes he needs to make  I will update labs and plan to see him back in 3 months  I will review the labs and see if adjustments are needed before the visit follow-up  He is to call for new concerns in the interim

## 2021-06-13 RX ORDER — PRAVASTATIN SODIUM 40 MG
TABLET ORAL
Qty: 30 TABLET | Refills: 3 | Status: SHIPPED | OUTPATIENT
Start: 2021-06-13 | End: 2021-10-28

## 2021-06-13 RX ORDER — METOPROLOL SUCCINATE 50 MG/1
TABLET, EXTENDED RELEASE ORAL
Qty: 30 TABLET | Refills: 3 | Status: SHIPPED | OUTPATIENT
Start: 2021-06-13 | End: 2021-10-28

## 2021-06-13 RX ORDER — AMLODIPINE BESYLATE 5 MG/1
TABLET ORAL
Qty: 30 TABLET | Refills: 3 | Status: SHIPPED | OUTPATIENT
Start: 2021-06-13 | End: 2021-10-28

## 2021-06-16 ENCOUNTER — LAB (OUTPATIENT)
Dept: FAMILY MEDICINE CLINIC | Facility: CLINIC | Age: 61
End: 2021-06-16

## 2021-06-16 LAB
ALBUMIN SERPL-MCNC: 4.5 G/DL (ref 3.5–5.2)
ALBUMIN/GLOB SERPL: 1.6 G/DL
ALP SERPL-CCNC: 70 U/L (ref 39–117)
ALT SERPL W P-5'-P-CCNC: 26 U/L (ref 1–41)
ANION GAP SERPL CALCULATED.3IONS-SCNC: 13.2 MMOL/L (ref 5–15)
AST SERPL-CCNC: 21 U/L (ref 1–40)
BILIRUB SERPL-MCNC: 0.5 MG/DL (ref 0–1.2)
BUN SERPL-MCNC: 12 MG/DL (ref 8–23)
BUN/CREAT SERPL: 14.6 (ref 7–25)
CALCIUM SPEC-SCNC: 9.3 MG/DL (ref 8.6–10.5)
CHLORIDE SERPL-SCNC: 88 MMOL/L (ref 98–107)
CHOLEST SERPL-MCNC: 145 MG/DL (ref 0–200)
CO2 SERPL-SCNC: 28.8 MMOL/L (ref 22–29)
CREAT SERPL-MCNC: 0.82 MG/DL (ref 0.76–1.27)
GFR SERPL CREATININE-BSD FRML MDRD: 96 ML/MIN/1.73
GLOBULIN UR ELPH-MCNC: 2.8 GM/DL
GLUCOSE SERPL-MCNC: 117 MG/DL (ref 65–99)
HDLC SERPL-MCNC: 42 MG/DL (ref 40–60)
LDLC SERPL CALC-MCNC: 76 MG/DL (ref 0–100)
LDLC/HDLC SERPL: 1.7 {RATIO}
POTASSIUM SERPL-SCNC: 3.1 MMOL/L (ref 3.5–5.2)
PROT SERPL-MCNC: 7.3 G/DL (ref 6–8.5)
SODIUM SERPL-SCNC: 130 MMOL/L (ref 136–145)
TRIGL SERPL-MCNC: 158 MG/DL (ref 0–150)
VLDLC SERPL-MCNC: 27 MG/DL (ref 5–40)

## 2021-06-16 PROCEDURE — 80053 COMPREHEN METABOLIC PANEL: CPT | Performed by: FAMILY MEDICINE

## 2021-06-16 PROCEDURE — 36415 COLL VENOUS BLD VENIPUNCTURE: CPT | Performed by: FAMILY MEDICINE

## 2021-06-16 PROCEDURE — 80061 LIPID PANEL: CPT | Performed by: FAMILY MEDICINE

## 2021-06-18 PROCEDURE — U0003 INFECTIOUS AGENT DETECTION BY NUCLEIC ACID (DNA OR RNA); SEVERE ACUTE RESPIRATORY SYNDROME CORONAVIRUS 2 (SARS-COV-2) (CORONAVIRUS DISEASE [COVID-19]), AMPLIFIED PROBE TECHNIQUE, MAKING USE OF HIGH THROUGHPUT TECHNOLOGIES AS DESCRIBED BY CMS-2020-01-R: HCPCS

## 2021-06-21 ENCOUNTER — OFFICE VISIT (OUTPATIENT)
Dept: FAMILY MEDICINE CLINIC | Facility: CLINIC | Age: 61
End: 2021-06-21

## 2021-06-21 VITALS
DIASTOLIC BLOOD PRESSURE: 81 MMHG | TEMPERATURE: 98 F | WEIGHT: 225.2 LBS | HEART RATE: 107 BPM | SYSTOLIC BLOOD PRESSURE: 121 MMHG | OXYGEN SATURATION: 93 % | BODY MASS INDEX: 35.27 KG/M2

## 2021-06-21 DIAGNOSIS — R50.9 FEBRILE ILLNESS, ACUTE: Primary | ICD-10-CM

## 2021-06-21 PROCEDURE — 99213 OFFICE O/P EST LOW 20 MIN: CPT | Performed by: FAMILY MEDICINE

## 2021-06-21 RX ORDER — CHLORCYCLIZINE HYDROCHLORIDE AND PSEUDOEPHEDRINE HYDROCHLORIDE 25; 60 MG/1; MG/1
0.5 TABLET ORAL 2 TIMES DAILY PRN
Qty: 30 TABLET | Refills: 0 | OUTPATIENT
Start: 2021-06-21 | End: 2022-07-07

## 2021-06-21 RX ORDER — AMOXICILLIN AND CLAVULANATE POTASSIUM 875; 125 MG/1; MG/1
1 TABLET, FILM COATED ORAL 2 TIMES DAILY
Qty: 20 TABLET | Refills: 0 | Status: SHIPPED | OUTPATIENT
Start: 2021-06-21 | End: 2021-07-01

## 2021-06-21 NOTE — PROGRESS NOTES
Subjective   Akira Barnett is a 61 y.o. male.     Akira Barnett is in for follow up on a recent urgent care visit where he was diagnosed with an acute febrile illness but was not sent home with any significant treatment.  He was tested for Covid and tested negative he states. There is no history of chest pain or dyspnea. There is no history of issue with bowel or bladder dysfunction. There is no history of dizziness or lightheadedness. There is no history of issue with sleep or mood. There is no history of issue with present medication.            /81 (BP Location: Left arm, Patient Position: Sitting, Cuff Size: Large Adult)   Pulse 107   Temp 98 °F (36.7 °C) (Oral)   Wt 102 kg (225 lb 3.2 oz)   SpO2 93%   BMI 35.27 kg/m²       Chief Complaint   Patient presents with   • Fever     every morning its 102.0 and he takes tyenlol to breaks it up - took it this morning- [facility] 06/18/21 - they did a covid test and it was negative - did not give him any medicine   • Headache     going on 5 days            Current Outpatient Medications:   •  amLODIPine (NORVASC) 5 MG tablet, TAKE ONE TABLET BY MOUTH DAILY, Disp: 30 tablet, Rfl: 3  •  aspirin 81 MG chewable tablet, Chew 81 mg Daily., Disp: , Rfl:   •  chlorthalidone (HYGROTON) 25 MG tablet, Take 1 tablet by mouth Daily., Disp: 90 tablet, Rfl: 1  •  fluticasone (Flonase) 50 MCG/ACT nasal spray, 2 sprays into the nostril(s) as directed by provider Daily., Disp: 1 bottle, Rfl: 2  •  icosapent ethyl (Vascepa) 1 g capsule capsule, Take 2 g by mouth 2 (Two) Times a Day With Meals., Disp: 120 capsule, Rfl: 3  •  loratadine (CLARITIN) 10 MG tablet, Take 10 mg by mouth Daily., Disp: , Rfl:   •  metoprolol succinate XL (TOPROL-XL) 50 MG 24 hr tablet, TAKE ONE TABLET BY MOUTH DAILY, Disp: 30 tablet, Rfl: 3  •  pravastatin (PRAVACHOL) 40 MG tablet, TAKE ONE TABLET BY MOUTH EVERY NIGHT AT BEDTIME, Disp: 30 tablet, Rfl: 3        The following portions of the  patient's history were reviewed and updated as appropriate: allergies, current medications, past family history, past medical history, past social history, past surgical history, and problem list.    Review of Systems   Constitutional: Positive for fever (102 on a few occasions). Negative for activity change and fatigue.   HENT: Positive for congestion. Negative for ear pain, sinus pressure, sinus pain, sore throat and trouble swallowing.    Eyes: Negative for visual disturbance.   Respiratory: Negative for chest tightness, shortness of breath and wheezing.    Cardiovascular: Negative for chest pain.   Gastrointestinal: Negative for abdominal distention, abdominal pain, constipation, diarrhea, nausea and vomiting.   Genitourinary: Negative for difficulty urinating and dysuria.   Musculoskeletal: Negative for back pain and neck pain.   Neurological: Positive for headaches.   Psychiatric/Behavioral: Negative for agitation, hallucinations and suicidal ideas.       Objective   Physical Exam  Vitals and nursing note reviewed.   Constitutional:       Appearance: He is ill-appearing.   HENT:      Right Ear: Tympanic membrane and ear canal normal.      Left Ear: Tympanic membrane and ear canal normal.      Nose: Congestion and rhinorrhea present.      Mouth/Throat:      Pharynx: Oropharynx is clear. No oropharyngeal exudate.   Eyes:      Conjunctiva/sclera: Conjunctivae normal.      Pupils: Pupils are equal, round, and reactive to light.   Cardiovascular:      Rate and Rhythm: Normal rate and regular rhythm.      Heart sounds: Normal heart sounds. No murmur heard.     Pulmonary:      Effort: Pulmonary effort is normal.      Breath sounds: No wheezing or rales.   Abdominal:      General: Bowel sounds are normal.      Palpations: Abdomen is soft.      Tenderness: There is no abdominal tenderness. There is no guarding.   Musculoskeletal:      Cervical back: Neck supple.   Lymphadenopathy:      Cervical: No cervical adenopathy.    Neurological:      Mental Status: He is alert.           Assessment/Plan   Problems Addressed this Visit     None      Visit Diagnoses     Febrile illness, acute    -  Primary      Diagnoses       Codes Comments    Febrile illness, acute    -  Primary ICD-10-CM: R50.9  ICD-9-CM: 780.60         I will see get some treatment for I think is a sinusitis  I will keep him home for couple of days to give him a chance to recuperate  He is to call me if not responding after about day 3 of treatment  He has had his Covid vaccination and now tested negative for Covid so that is not a concern

## 2021-06-23 ENCOUNTER — TELEPHONE (OUTPATIENT)
Dept: FAMILY MEDICINE CLINIC | Facility: CLINIC | Age: 61
End: 2021-06-23

## 2021-06-23 NOTE — TELEPHONE ENCOUNTER
The Stahist can cause some flushing  I do not think the antibiotic would have done anything like that  If he is not running fever, I think he can work, as long as his work will let him come to work  He has had the Covid vaccine and he tested negative for Covid

## 2021-06-23 NOTE — TELEPHONE ENCOUNTER
Caller: Akira Barnett    Relationship: Self    Best call back number: [phone] (H)  What medications are you currently taking:   Current Outpatient Medications on File Prior to Visit   Medication Sig Dispense Refill   • amLODIPine (NORVASC) 5 MG tablet TAKE ONE TABLET BY MOUTH DAILY 30 tablet 3   • amoxicillin-clavulanate (Augmentin) 875-125 MG per tablet Take 1 tablet by mouth 2 (Two) Times a Day for 10 days. 20 tablet 0   • aspirin 81 MG chewable tablet Chew 81 mg Daily.     • Chlorcyclizine-Pseudoephed (Stahist AD) 25-60 MG tablet Take 0.5 tablets by mouth 2 (Two) Times a Day As Needed (Congestion, headache). 30 tablet 0   • chlorthalidone (HYGROTON) 25 MG tablet Take 1 tablet by mouth Daily. 90 tablet 1   • fluticasone (Flonase) 50 MCG/ACT nasal spray 2 sprays into the nostril(s) as directed by provider Daily. 1 bottle 2   • icosapent ethyl (Vascepa) 1 g capsule capsule Take 2 g by mouth 2 (Two) Times a Day With Meals. 120 capsule 3   • loratadine (CLARITIN) 10 MG tablet Take 10 mg by mouth Daily.     • metoprolol succinate XL (TOPROL-XL) 50 MG 24 hr tablet TAKE ONE TABLET BY MOUTH DAILY 30 tablet 3   • pravastatin (PRAVACHOL) 40 MG tablet TAKE ONE TABLET BY MOUTH EVERY NIGHT AT BEDTIME 30 tablet 3     No current facility-administered medications on file prior to visit.        When did you start taking these medications: 6/21/21    Which medication are you concerned about: amoxicillin-clavulanate (Augmentin) 875-125 MG per tablet AND Chlorcyclizine-Pseudoephed (Stahist AD) 25-60 MG tablet    Who prescribed you this medication: MD JULIANA MERLOS    What are your concerns: PATIENT STATES HE WAS PRESCRIBED THESE MEDICATIONS. HIS WORK SENT HIM HOME TODAY  STATING THAT HE WAS VERY FLUSHED. PATIENT WANTS TO KNOW IF THE MEDICATIONS COULD HAVE SIDE EFFECTS. PATIENT DENIES ANY SHORTNESS OF BREATH, CHEST PAIN, NUMBNESS OR TINGLING. PATIENT STATES THAT HE DOES SEEM VERY JITTERY WITH THE MEDICATION. PLEASE  ADVISE.     How long have you been taking these medications:6/21/21    How long have you had these concerns: 6/22/21

## 2021-06-23 NOTE — TELEPHONE ENCOUNTER
Caller: Aikra Barnett    Relationship: Self    Best call back number: [phone]*    What is the best time to reach you: ANYTIME    Who are you requesting to speak with (clinical staff, provider,  specific staff member): BRODY DIEZ    Do you know the name of the person who called: BRODY    Do you require a callback: YES, PATIENT REQUESTING A CALLBACK.

## 2021-09-15 ENCOUNTER — OFFICE VISIT (OUTPATIENT)
Dept: FAMILY MEDICINE CLINIC | Facility: CLINIC | Age: 61
End: 2021-09-15

## 2021-09-15 VITALS
BODY MASS INDEX: 30.54 KG/M2 | DIASTOLIC BLOOD PRESSURE: 80 MMHG | HEART RATE: 75 BPM | WEIGHT: 195 LBS | OXYGEN SATURATION: 96 % | SYSTOLIC BLOOD PRESSURE: 133 MMHG

## 2021-09-15 DIAGNOSIS — E78.5 HYPERLIPIDEMIA, UNSPECIFIED HYPERLIPIDEMIA TYPE: ICD-10-CM

## 2021-09-15 DIAGNOSIS — I10 ESSENTIAL HYPERTENSION: Primary | ICD-10-CM

## 2021-09-15 PROCEDURE — 99213 OFFICE O/P EST LOW 20 MIN: CPT | Performed by: FAMILY MEDICINE

## 2021-10-28 RX ORDER — METOPROLOL SUCCINATE 50 MG/1
TABLET, EXTENDED RELEASE ORAL
Qty: 30 TABLET | Refills: 3 | Status: SHIPPED | OUTPATIENT
Start: 2021-10-28 | End: 2022-02-27

## 2021-10-28 RX ORDER — AMLODIPINE BESYLATE 5 MG/1
TABLET ORAL
Qty: 30 TABLET | Refills: 3 | Status: SHIPPED | OUTPATIENT
Start: 2021-10-28 | End: 2022-02-27

## 2021-10-28 RX ORDER — PRAVASTATIN SODIUM 40 MG
TABLET ORAL
Qty: 30 TABLET | Refills: 3 | Status: SHIPPED | OUTPATIENT
Start: 2021-10-28 | End: 2022-02-27

## 2022-01-19 RX ORDER — CHLORTHALIDONE 25 MG/1
TABLET ORAL
Qty: 30 TABLET | Refills: 2 | Status: SHIPPED | OUTPATIENT
Start: 2022-01-19 | End: 2022-03-16

## 2022-02-27 RX ORDER — PRAVASTATIN SODIUM 40 MG
TABLET ORAL
Qty: 30 TABLET | Refills: 3 | Status: SHIPPED | OUTPATIENT
Start: 2022-02-27 | End: 2022-06-25

## 2022-02-27 RX ORDER — AMLODIPINE BESYLATE 5 MG/1
TABLET ORAL
Qty: 30 TABLET | Refills: 3 | Status: SHIPPED | OUTPATIENT
Start: 2022-02-27 | End: 2022-06-25

## 2022-02-27 RX ORDER — METOPROLOL SUCCINATE 50 MG/1
TABLET, EXTENDED RELEASE ORAL
Qty: 30 TABLET | Refills: 3 | Status: SHIPPED | OUTPATIENT
Start: 2022-02-27 | End: 2022-06-25

## 2022-03-16 ENCOUNTER — OFFICE VISIT (OUTPATIENT)
Dept: FAMILY MEDICINE CLINIC | Facility: CLINIC | Age: 62
End: 2022-03-16

## 2022-03-16 ENCOUNTER — LAB (OUTPATIENT)
Dept: FAMILY MEDICINE CLINIC | Facility: CLINIC | Age: 62
End: 2022-03-16

## 2022-03-16 VITALS
SYSTOLIC BLOOD PRESSURE: 122 MMHG | BODY MASS INDEX: 30.38 KG/M2 | DIASTOLIC BLOOD PRESSURE: 70 MMHG | OXYGEN SATURATION: 96 % | TEMPERATURE: 98 F | WEIGHT: 194 LBS | HEART RATE: 78 BPM

## 2022-03-16 DIAGNOSIS — Z12.11 ENCOUNTER FOR SCREENING FOR MALIGNANT NEOPLASM OF COLON: ICD-10-CM

## 2022-03-16 DIAGNOSIS — Z12.5 ENCOUNTER FOR SCREENING FOR MALIGNANT NEOPLASM OF PROSTATE: ICD-10-CM

## 2022-03-16 DIAGNOSIS — I10 PRIMARY HYPERTENSION: Primary | ICD-10-CM

## 2022-03-16 DIAGNOSIS — E78.5 HYPERLIPIDEMIA, UNSPECIFIED HYPERLIPIDEMIA TYPE: ICD-10-CM

## 2022-03-16 LAB
ALBUMIN SERPL-MCNC: 4.7 G/DL (ref 3.5–5.2)
ALBUMIN/GLOB SERPL: 2 G/DL
ALP SERPL-CCNC: 71 U/L (ref 39–117)
ALT SERPL W P-5'-P-CCNC: 18 U/L (ref 1–41)
ANION GAP SERPL CALCULATED.3IONS-SCNC: 9.3 MMOL/L (ref 5–15)
AST SERPL-CCNC: 18 U/L (ref 1–40)
BILIRUB SERPL-MCNC: 0.3 MG/DL (ref 0–1.2)
BUN SERPL-MCNC: 17 MG/DL (ref 8–23)
BUN/CREAT SERPL: 20.7 (ref 7–25)
CALCIUM SPEC-SCNC: 9.6 MG/DL (ref 8.6–10.5)
CHLORIDE SERPL-SCNC: 100 MMOL/L (ref 98–107)
CHOLEST SERPL-MCNC: 174 MG/DL (ref 0–200)
CO2 SERPL-SCNC: 27.7 MMOL/L (ref 22–29)
CREAT SERPL-MCNC: 0.82 MG/DL (ref 0.76–1.27)
EGFRCR SERPLBLD CKD-EPI 2021: 99.9 ML/MIN/1.73
GLOBULIN UR ELPH-MCNC: 2.4 GM/DL
GLUCOSE SERPL-MCNC: 85 MG/DL (ref 65–99)
HDLC SERPL-MCNC: 41 MG/DL (ref 40–60)
LDLC SERPL CALC-MCNC: 93 MG/DL (ref 0–100)
LDLC/HDLC SERPL: 2.1 {RATIO}
POTASSIUM SERPL-SCNC: 4 MMOL/L (ref 3.5–5.2)
PROT SERPL-MCNC: 7.1 G/DL (ref 6–8.5)
PSA SERPL-MCNC: 0.26 NG/ML (ref 0–4)
SODIUM SERPL-SCNC: 137 MMOL/L (ref 136–145)
TRIGL SERPL-MCNC: 235 MG/DL (ref 0–150)
VLDLC SERPL-MCNC: 40 MG/DL (ref 5–40)

## 2022-03-16 PROCEDURE — 36415 COLL VENOUS BLD VENIPUNCTURE: CPT | Performed by: FAMILY MEDICINE

## 2022-03-16 PROCEDURE — 80053 COMPREHEN METABOLIC PANEL: CPT | Performed by: FAMILY MEDICINE

## 2022-03-16 PROCEDURE — G0103 PSA SCREENING: HCPCS | Performed by: FAMILY MEDICINE

## 2022-03-16 PROCEDURE — 80061 LIPID PANEL: CPT | Performed by: FAMILY MEDICINE

## 2022-03-16 PROCEDURE — 99213 OFFICE O/P EST LOW 20 MIN: CPT | Performed by: FAMILY MEDICINE

## 2022-03-16 RX ORDER — SILDENAFIL 100 MG/1
100 TABLET, FILM COATED ORAL DAILY PRN
Qty: 15 TABLET | Refills: 1 | Status: SHIPPED | OUTPATIENT
Start: 2022-03-16 | End: 2023-03-21

## 2022-03-16 NOTE — PROGRESS NOTES
Subjective   Akira Barnett is a 61 y.o. male.     Akira Barnett is in for follow up on his high blood pressure and high cholesterol.  He has continued to work on diet and exercise changes and has maintained the weight loss noted previously.  He has had some erectile dysfunction issues but nothing severe, though he would like some medication to have on hand.  There is no history of chest pain or dyspnea. There is no history of issue with bowel or bladder dysfunction. There is no history of dizziness or lightheadedness. There is no history of issue with sleep or mood. There is no history of issue with present medication.            /70 (BP Location: Left arm, Patient Position: Sitting, Cuff Size: Large Adult)   Pulse 78   Temp 98 °F (36.7 °C) (Temporal)   Wt 88 kg (194 lb)   SpO2 96%   BMI 30.38 kg/m²       Chief Complaint   Patient presents with   • Hypertension           Current Outpatient Medications:   •  amLODIPine (NORVASC) 5 MG tablet, TAKE ONE TABLET BY MOUTH DAILY, Disp: 30 tablet, Rfl: 3  •  aspirin 81 MG chewable tablet, Chew 81 mg Daily., Disp: , Rfl:   •  Chlorcyclizine-Pseudoephed (Stahist AD) 25-60 MG tablet, Take 0.5 tablets by mouth 2 (Two) Times a Day As Needed (Congestion, headache)., Disp: 30 tablet, Rfl: 0  •  fluticasone (Flonase) 50 MCG/ACT nasal spray, 2 sprays into the nostril(s) as directed by provider Daily., Disp: 1 bottle, Rfl: 2  •  icosapent ethyl (Vascepa) 1 g capsule capsule, Take 2 g by mouth 2 (Two) Times a Day With Meals., Disp: 120 capsule, Rfl: 3  •  loratadine (CLARITIN) 10 MG tablet, Take 10 mg by mouth Daily., Disp: , Rfl:   •  metoprolol succinate XL (TOPROL-XL) 50 MG 24 hr tablet, TAKE ONE TABLET BY MOUTH DAILY, Disp: 30 tablet, Rfl: 3  •  pravastatin (PRAVACHOL) 40 MG tablet, TAKE ONE TABLET BY MOUTH EVERY NIGHT AT BEDTIME, Disp: 30 tablet, Rfl: 3  •  sildenafil (Viagra) 100 MG tablet, Take 1 tablet by mouth Daily As Needed for Erectile Dysfunction., Disp: 15  tablet, Rfl: 1        The following portions of the patient's history were reviewed and updated as appropriate: allergies, current medications, past family history, past medical history, past social history, past surgical history, and problem list.    Review of Systems   Constitutional: Negative for activity change, fatigue and fever.   HENT: Negative for congestion, sinus pressure, sinus pain, sore throat and trouble swallowing.    Eyes: Negative for visual disturbance.   Respiratory: Negative for chest tightness, shortness of breath and wheezing.    Cardiovascular: Negative for chest pain.   Gastrointestinal: Negative for abdominal distention, abdominal pain, constipation, diarrhea, nausea and vomiting.   Genitourinary: Negative for difficulty urinating and dysuria.   Musculoskeletal: Negative for back pain and neck pain.   Neurological: Negative for dizziness, weakness and numbness.   Psychiatric/Behavioral: Negative for agitation, hallucinations, sleep disturbance and suicidal ideas.       Objective   Physical Exam  Vitals and nursing note reviewed.   Constitutional:       Appearance: He is obese.   Cardiovascular:      Rate and Rhythm: Normal rate and regular rhythm.      Heart sounds: Normal heart sounds. No murmur heard.  Pulmonary:      Effort: Pulmonary effort is normal.      Breath sounds: No wheezing or rales.   Abdominal:      General: Bowel sounds are normal. There is no distension.      Palpations: Abdomen is soft.      Tenderness: There is no abdominal tenderness. There is no guarding.   Musculoskeletal:         General: No signs of injury.      Cervical back: Neck supple.      Right lower leg: No edema.      Left lower leg: No edema.   Lymphadenopathy:      Cervical: No cervical adenopathy.   Skin:     Findings: No rash.   Neurological:      General: No focal deficit present.      Mental Status: He is alert and oriented to person, place, and time.   Psychiatric:         Mood and Affect: Mood normal.            Assessment/Plan   Problems Addressed this Visit        Cardiac and Vasculature    Hypertension - Primary    Relevant Orders    Comprehensive metabolic panel    Lipid panel    Hyperlipidemia    Relevant Orders    Comprehensive metabolic panel    Lipid panel      Other Visit Diagnoses     Encounter for screening for malignant neoplasm of prostate        Relevant Orders    PSA SCREENING    Encounter for screening for malignant neoplasm of colon          Diagnoses       Codes Comments    Primary hypertension    -  Primary ICD-10-CM: I10  ICD-9-CM: 401.9     Hyperlipidemia, unspecified hyperlipidemia type     ICD-10-CM: E78.5  ICD-9-CM: 272.4     Encounter for screening for malignant neoplasm of prostate     ICD-10-CM: Z12.5  ICD-9-CM: V76.44     Encounter for screening for malignant neoplasm of colon     ICD-10-CM: Z12.11  ICD-9-CM: V76.51         I will stop his chlorthalidone since he is maintained the recent weight loss  I have encouraged him to continue the diet and exercise changes  I will see him back in the office in 6 months but I did advise him to come get a blood pressure check in about 6 to 8 weeks  If he continues to maintain good blood pressures I will be open to further reductions in his blood pressure management  I did give him a prescription for sildenafil to use for ED symptoms  He was encouraged to get a COVID booster again in 6 months

## 2022-05-01 RX ORDER — CHLORTHALIDONE 25 MG/1
TABLET ORAL
Qty: 30 TABLET | Refills: 3 | Status: SHIPPED | OUTPATIENT
Start: 2022-05-01 | End: 2022-09-01

## 2022-06-25 RX ORDER — PRAVASTATIN SODIUM 40 MG
TABLET ORAL
Qty: 30 TABLET | Refills: 3 | Status: SHIPPED | OUTPATIENT
Start: 2022-06-25 | End: 2022-10-30

## 2022-06-25 RX ORDER — AMLODIPINE BESYLATE 5 MG/1
TABLET ORAL
Qty: 30 TABLET | Refills: 3 | Status: SHIPPED | OUTPATIENT
Start: 2022-06-25 | End: 2022-10-30

## 2022-06-25 RX ORDER — METOPROLOL SUCCINATE 50 MG/1
TABLET, EXTENDED RELEASE ORAL
Qty: 30 TABLET | Refills: 3 | Status: SHIPPED | OUTPATIENT
Start: 2022-06-25 | End: 2022-10-30

## 2022-07-27 ENCOUNTER — TELEPHONE (OUTPATIENT)
Dept: FAMILY MEDICINE CLINIC | Facility: CLINIC | Age: 62
End: 2022-07-27

## 2022-07-27 RX ORDER — PREDNISONE 10 MG/1
10 TABLET ORAL DAILY
Qty: 7 TABLET | Refills: 0 | Status: SHIPPED | OUTPATIENT
Start: 2022-07-27 | End: 2022-08-03

## 2022-07-27 RX ORDER — CHLORCYCLIZINE HYDROCHLORIDE AND PSEUDOEPHEDRINE HYDROCHLORIDE 25; 60 MG/1; MG/1
0.5 TABLET ORAL 2 TIMES DAILY PRN
Qty: 30 TABLET | Refills: 1 | Status: SHIPPED | OUTPATIENT
Start: 2022-07-27 | End: 2022-08-05 | Stop reason: SDUPTHER

## 2022-07-27 NOTE — TELEPHONE ENCOUNTER
I did send 2 prescriptions in for him but I did not send Paxlovid because it is primarily for 70 and over or heart patients/diabetics  It also has not been as effective for this current strain

## 2022-07-27 NOTE — TELEPHONE ENCOUNTER
Caller: Akira Barnett    Relationship to patient: Self    Best call back number: [phone]      Date of exposure: UNKOWN    Date of positive COVID19 test: 07/28/22        COVID19 symptoms: LOW GRADE FEVER, COUGH, BODY ACHES, FEELS LIKE BAD SINUS INFECTION.      Additional information or concerns: HE IS ASKING ABOUT THE PAXLOVID AND IF THAT WOULD BE A GOOD OPTION FOR HIM OR WHAT HE SHOULD BE DOING OR TAKING.    What is the patients preferred pharmacy:   [facility] - [place], [place] - [address] [place] [address] [phone] [facility] [phone] FX

## 2022-08-05 ENCOUNTER — TELEPHONE (OUTPATIENT)
Dept: FAMILY MEDICINE CLINIC | Facility: CLINIC | Age: 62
End: 2022-08-05

## 2022-08-05 RX ORDER — CHLORCYCLIZINE HYDROCHLORIDE AND PSEUDOEPHEDRINE HYDROCHLORIDE 25; 60 MG/1; MG/1
0.5 TABLET ORAL 2 TIMES DAILY PRN
Qty: 30 TABLET | Refills: 1 | Status: SHIPPED | OUTPATIENT
Start: 2022-08-05 | End: 2022-09-19

## 2022-08-05 RX ORDER — PREDNISONE 10 MG/1
10 TABLET ORAL DAILY
Qty: 5 TABLET | Refills: 0 | Status: SHIPPED | OUTPATIENT
Start: 2022-08-05 | End: 2022-08-10

## 2022-08-05 NOTE — TELEPHONE ENCOUNTER
It is not unusual to have that several days after Covid, especially with this strain  I would work on hydrating and I can send out something for the symptoms, if he likes  It would not be wrong to retest and make sure he is not turning positive again

## 2022-08-05 NOTE — TELEPHONE ENCOUNTER
Caller: Akira Barnett    Relationship: Self    Best call back number: [phone]    What is the best time to reach you: ANY     Who are you requesting to speak with (clinical staff, provider,  specific staff member):CLINICAL     What was the call regarding: PATIENT CALLED AND STATED HE TESTED POSITIVE FOR COVID ON 7/26/22. PATIENT STATED HE IS NO LONGER EXPERIENCING COVID SYMPTOMS. PATIENT MENTIONED HE IS FEELING A THROAT TICKLE AND WOULD LIKE HIS THROAT EXAMINED.PATIENT WOULD LIKE TO KNOW IF THIS IS A NORMAL SIDE EFFECT IF NOT PATIENT PREFERS TO ONLY SEE . PLEASE ADVISE     Do you require a callback:YES

## 2022-09-01 RX ORDER — CHLORTHALIDONE 25 MG/1
TABLET ORAL
Qty: 30 TABLET | Refills: 3 | Status: SHIPPED | OUTPATIENT
Start: 2022-09-01 | End: 2022-12-29

## 2022-09-19 ENCOUNTER — OFFICE VISIT (OUTPATIENT)
Dept: FAMILY MEDICINE CLINIC | Facility: CLINIC | Age: 62
End: 2022-09-19

## 2022-09-19 VITALS
BODY MASS INDEX: 30.7 KG/M2 | DIASTOLIC BLOOD PRESSURE: 75 MMHG | HEART RATE: 85 BPM | SYSTOLIC BLOOD PRESSURE: 129 MMHG | WEIGHT: 196 LBS | TEMPERATURE: 98.2 F | OXYGEN SATURATION: 96 %

## 2022-09-19 DIAGNOSIS — I10 ESSENTIAL HYPERTENSION: Primary | ICD-10-CM

## 2022-09-19 DIAGNOSIS — Z23 IMMUNIZATION DUE: ICD-10-CM

## 2022-09-19 DIAGNOSIS — N52.2 DRUG-INDUCED ERECTILE DYSFUNCTION: ICD-10-CM

## 2022-09-19 DIAGNOSIS — E78.5 HYPERLIPIDEMIA, UNSPECIFIED HYPERLIPIDEMIA TYPE: ICD-10-CM

## 2022-09-19 PROCEDURE — 99213 OFFICE O/P EST LOW 20 MIN: CPT | Performed by: FAMILY MEDICINE

## 2022-09-19 PROCEDURE — 90686 IIV4 VACC NO PRSV 0.5 ML IM: CPT | Performed by: FAMILY MEDICINE

## 2022-09-19 PROCEDURE — 90471 IMMUNIZATION ADMIN: CPT | Performed by: FAMILY MEDICINE

## 2022-09-19 NOTE — PROGRESS NOTES
Subjective   Akira Barnett is a 62 y.o. male.     History of Present Illness  Akira Barnett is in for follow up on his issues with high blood pressure and high cholesterol. There is no history of chest pain or dyspnea. There is no history of issue with bowel or bladder dysfunction. There is no history of dizziness or lightheadedness. There is no history of issue with sleep or mood.  He is mentioning some erectile dysfunction symptoms that have not responded to use of Viagra.  He does get some exercise and tries to be somewhat active.  He has some arthritis in his knees but nothing severe or limiting.  There is no history of issue with present medication.            /75 (BP Location: Left arm, Patient Position: Sitting, Cuff Size: Large Adult)   Pulse 85   Temp 98.2 °F (36.8 °C) (Temporal)   Wt 88.9 kg (196 lb)   SpO2 96%   BMI 30.70 kg/m²       Chief Complaint   Patient presents with   • Hypertension     6 month f/u            Current Outpatient Medications:   •  amLODIPine (NORVASC) 5 MG tablet, TAKE ONE TABLET BY MOUTH DAILY, Disp: 30 tablet, Rfl: 3  •  aspirin 81 MG chewable tablet, Chew 81 mg Daily., Disp: , Rfl:   •  chlorthalidone (HYGROTON) 25 MG tablet, TAKE ONE TABLET BY MOUTH DAILY, Disp: 30 tablet, Rfl: 3  •  fluticasone (Flonase) 50 MCG/ACT nasal spray, 2 sprays into the nostril(s) as directed by provider Daily., Disp: 1 bottle, Rfl: 2  •  loratadine (CLARITIN) 10 MG tablet, Take 10 mg by mouth Daily., Disp: , Rfl:   •  metoprolol succinate XL (TOPROL-XL) 50 MG 24 hr tablet, TAKE ONE TABLET BY MOUTH DAILY, Disp: 30 tablet, Rfl: 3  •  pravastatin (PRAVACHOL) 40 MG tablet, TAKE ONE TABLET BY MOUTH EVERY NIGHT AT BEDTIME, Disp: 30 tablet, Rfl: 3  •  sildenafil (Viagra) 100 MG tablet, Take 1 tablet by mouth Daily As Needed for Erectile Dysfunction., Disp: 15 tablet, Rfl: 1        The following portions of the patient's history were reviewed and updated as appropriate: allergies, current  medications, past family history, past medical history, past social history, past surgical history, and problem list.    Review of Systems   Constitutional: Negative for activity change, fatigue and fever.   HENT: Negative for congestion, sinus pressure, sinus pain, sore throat and trouble swallowing.    Eyes: Negative for visual disturbance.   Respiratory: Negative for chest tightness, shortness of breath and wheezing.    Cardiovascular: Negative for chest pain.   Gastrointestinal: Negative for abdominal distention, abdominal pain, constipation, diarrhea, nausea and vomiting.   Genitourinary: Negative for difficulty urinating and dysuria.   Musculoskeletal: Negative for back pain and neck pain.   Neurological: Negative for dizziness, weakness and numbness.   Psychiatric/Behavioral: Negative for agitation, hallucinations, sleep disturbance and suicidal ideas.       Objective   Physical Exam  Vitals and nursing note reviewed.   HENT:      Right Ear: Tympanic membrane and ear canal normal.      Left Ear: Tympanic membrane and ear canal normal.   Eyes:      Conjunctiva/sclera: Conjunctivae normal.      Pupils: Pupils are equal, round, and reactive to light.   Cardiovascular:      Rate and Rhythm: Normal rate and regular rhythm.      Heart sounds: Normal heart sounds. No murmur heard.  Pulmonary:      Effort: Pulmonary effort is normal.      Breath sounds: No wheezing or rales.   Abdominal:      General: Bowel sounds are normal.      Palpations: Abdomen is soft.      Tenderness: There is no abdominal tenderness. There is no guarding.   Musculoskeletal:      Cervical back: Neck supple.      Right lower leg: No edema.      Left lower leg: No edema.      Comments: Age-appropriate arthritis in the knees and hands   Lymphadenopathy:      Cervical: No cervical adenopathy.   Neurological:      General: No focal deficit present.      Mental Status: He is alert and oriented to person, place, and time.   Psychiatric:         Mood  and Affect: Mood normal.           Assessment & Plan   Problems Addressed this Visit        Cardiac and Vasculature    Hyperlipidemia    Relevant Orders    Comprehensive metabolic panel    Lipid panel      Other Visit Diagnoses     Essential hypertension    -  Primary    Relevant Orders    Comprehensive metabolic panel    Lipid panel      Diagnoses       Codes Comments    Essential hypertension    -  Primary ICD-10-CM: I10  ICD-9-CM: 401.9     Hyperlipidemia, unspecified hyperlipidemia type     ICD-10-CM: E78.5  ICD-9-CM: 272.4         I will update some labs and adjust the treatment plan as indicated  I have asked him to see urology for the erectile dysfunction issues  I have asked him to remain as active as he can tolerate with exercise  I will see him back again in 6 months, sooner if needed  I did give him his flu vaccine today and I encouraged him to get his updated COVID-vaccine in October, allowing some time for him to finish recovery from his illness that he had with COVID at the end of July/early August

## 2022-09-22 ENCOUNTER — LAB (OUTPATIENT)
Dept: FAMILY MEDICINE CLINIC | Facility: CLINIC | Age: 62
End: 2022-09-22

## 2022-09-22 PROCEDURE — 80053 COMPREHEN METABOLIC PANEL: CPT | Performed by: FAMILY MEDICINE

## 2022-09-22 PROCEDURE — 80061 LIPID PANEL: CPT | Performed by: FAMILY MEDICINE

## 2022-09-22 PROCEDURE — 36415 COLL VENOUS BLD VENIPUNCTURE: CPT | Performed by: FAMILY MEDICINE

## 2022-09-23 LAB
ALBUMIN SERPL-MCNC: 4.3 G/DL (ref 3.5–5.2)
ALBUMIN/GLOB SERPL: 1.7 G/DL
ALP SERPL-CCNC: 83 U/L (ref 39–117)
ALT SERPL W P-5'-P-CCNC: 23 U/L (ref 1–41)
ANION GAP SERPL CALCULATED.3IONS-SCNC: 13.3 MMOL/L (ref 5–15)
AST SERPL-CCNC: 17 U/L (ref 1–40)
BILIRUB SERPL-MCNC: 0.3 MG/DL (ref 0–1.2)
BUN SERPL-MCNC: 15 MG/DL (ref 8–23)
BUN/CREAT SERPL: 17.4 (ref 7–25)
CALCIUM SPEC-SCNC: 9.9 MG/DL (ref 8.6–10.5)
CHLORIDE SERPL-SCNC: 96 MMOL/L (ref 98–107)
CHOLEST SERPL-MCNC: 171 MG/DL (ref 0–200)
CO2 SERPL-SCNC: 24.7 MMOL/L (ref 22–29)
CREAT SERPL-MCNC: 0.86 MG/DL (ref 0.76–1.27)
EGFRCR SERPLBLD CKD-EPI 2021: 97.9 ML/MIN/1.73
GLOBULIN UR ELPH-MCNC: 2.5 GM/DL
GLUCOSE SERPL-MCNC: 97 MG/DL (ref 65–99)
HDLC SERPL-MCNC: 37 MG/DL (ref 40–60)
LDLC SERPL CALC-MCNC: 77 MG/DL (ref 0–100)
LDLC/HDLC SERPL: 1.69 {RATIO}
POTASSIUM SERPL-SCNC: 3.6 MMOL/L (ref 3.5–5.2)
PROT SERPL-MCNC: 6.8 G/DL (ref 6–8.5)
SODIUM SERPL-SCNC: 134 MMOL/L (ref 136–145)
TRIGL SERPL-MCNC: 358 MG/DL (ref 0–150)
VLDLC SERPL-MCNC: 57 MG/DL (ref 5–40)

## 2022-10-30 RX ORDER — AMLODIPINE BESYLATE 5 MG/1
TABLET ORAL
Qty: 30 TABLET | Refills: 3 | Status: SHIPPED | OUTPATIENT
Start: 2022-10-30 | End: 2023-03-01

## 2022-10-30 RX ORDER — METOPROLOL SUCCINATE 50 MG/1
TABLET, EXTENDED RELEASE ORAL
Qty: 30 TABLET | Refills: 3 | Status: SHIPPED | OUTPATIENT
Start: 2022-10-30 | End: 2023-03-01

## 2022-10-30 RX ORDER — PRAVASTATIN SODIUM 40 MG
TABLET ORAL
Qty: 30 TABLET | Refills: 3 | Status: SHIPPED | OUTPATIENT
Start: 2022-10-30 | End: 2023-03-01

## 2022-12-29 RX ORDER — CHLORTHALIDONE 25 MG/1
TABLET ORAL
Qty: 30 TABLET | Refills: 3 | Status: SHIPPED | OUTPATIENT
Start: 2022-12-29 | End: 2023-03-21 | Stop reason: SDUPTHER

## 2022-12-29 NOTE — TELEPHONE ENCOUNTER
Diagnosis: Primary osteoarthritis involving multiple joints (M89.49)          Next MD visit: none scheduled  Fall Risk: standard         Precautions: n/a          Medication Changes since last visit?: No      Subjective: Patient states she was a little s Lv:9/19/22  Apt: 3/21/23

## 2023-03-01 RX ORDER — PRAVASTATIN SODIUM 40 MG
TABLET ORAL
Qty: 30 TABLET | Refills: 3 | Status: SHIPPED | OUTPATIENT
Start: 2023-03-01 | End: 2023-03-21 | Stop reason: SDUPTHER

## 2023-03-01 RX ORDER — METOPROLOL SUCCINATE 50 MG/1
TABLET, EXTENDED RELEASE ORAL
Qty: 30 TABLET | Refills: 3 | Status: SHIPPED | OUTPATIENT
Start: 2023-03-01 | End: 2023-03-21 | Stop reason: SDUPTHER

## 2023-03-01 RX ORDER — AMLODIPINE BESYLATE 5 MG/1
TABLET ORAL
Qty: 30 TABLET | Refills: 3 | Status: SHIPPED | OUTPATIENT
Start: 2023-03-01 | End: 2023-03-21 | Stop reason: SDUPTHER

## 2023-03-08 PROBLEM — K92.1 MELENA: Status: ACTIVE | Noted: 2019-02-05

## 2023-03-08 PROBLEM — M25.532 WRIST PAIN, LEFT: Status: ACTIVE | Noted: 2017-08-02

## 2023-03-21 ENCOUNTER — OFFICE VISIT (OUTPATIENT)
Dept: FAMILY MEDICINE CLINIC | Facility: CLINIC | Age: 63
End: 2023-03-21
Payer: COMMERCIAL

## 2023-03-21 VITALS
DIASTOLIC BLOOD PRESSURE: 74 MMHG | TEMPERATURE: 97.5 F | SYSTOLIC BLOOD PRESSURE: 134 MMHG | HEART RATE: 71 BPM | WEIGHT: 201.2 LBS | BODY MASS INDEX: 31.51 KG/M2 | OXYGEN SATURATION: 98 %

## 2023-03-21 DIAGNOSIS — E78.5 HYPERLIPIDEMIA, UNSPECIFIED HYPERLIPIDEMIA TYPE: ICD-10-CM

## 2023-03-21 DIAGNOSIS — I10 ESSENTIAL HYPERTENSION: Primary | ICD-10-CM

## 2023-03-21 DIAGNOSIS — R09.81 NASAL CONGESTION: ICD-10-CM

## 2023-03-21 DIAGNOSIS — Z12.5 ENCOUNTER FOR SCREENING FOR MALIGNANT NEOPLASM OF PROSTATE: ICD-10-CM

## 2023-03-21 PROCEDURE — 99213 OFFICE O/P EST LOW 20 MIN: CPT | Performed by: FAMILY MEDICINE

## 2023-03-21 RX ORDER — AMLODIPINE BESYLATE 5 MG/1
5 TABLET ORAL DAILY
Qty: 90 TABLET | Refills: 1 | Status: SHIPPED | OUTPATIENT
Start: 2023-03-21

## 2023-03-21 RX ORDER — CHLORTHALIDONE 25 MG/1
25 TABLET ORAL DAILY
Qty: 90 TABLET | Refills: 1 | Status: SHIPPED | OUTPATIENT
Start: 2023-03-21

## 2023-03-21 RX ORDER — TADALAFIL 10 MG/1
10 TABLET ORAL EVERY OTHER DAY
Qty: 15 TABLET | Refills: 3 | Status: SHIPPED | OUTPATIENT
Start: 2023-03-21 | End: 2023-03-21

## 2023-03-21 RX ORDER — FLUTICASONE PROPIONATE 50 MCG
2 SPRAY, SUSPENSION (ML) NASAL DAILY
Qty: 48 G | Refills: 1 | Status: SHIPPED | OUTPATIENT
Start: 2023-03-21

## 2023-03-21 RX ORDER — TADALAFIL 10 MG/1
10 TABLET ORAL EVERY OTHER DAY
Qty: 15 TABLET | Refills: 3 | Status: SHIPPED | OUTPATIENT
Start: 2023-03-21

## 2023-03-21 RX ORDER — PRAVASTATIN SODIUM 40 MG
40 TABLET ORAL
Qty: 90 TABLET | Refills: 1 | Status: SHIPPED | OUTPATIENT
Start: 2023-03-21

## 2023-03-21 RX ORDER — METOPROLOL SUCCINATE 50 MG/1
50 TABLET, EXTENDED RELEASE ORAL DAILY
Qty: 90 TABLET | Refills: 1 | Status: SHIPPED | OUTPATIENT
Start: 2023-03-21

## 2023-03-21 NOTE — PROGRESS NOTES
Subjective   Akira Barnett is a 62 y.o. male.     History of Present Illness  Akira Barnett is in for follow up on his high blood pressure and high cholesterol. He has been seeing podiatry for some foot issues, and things are improving. There is no history of chest pain or dyspnea. There is no history of issue with bowel or bladder dysfunction. There is no history of dizziness or lightheadedness. There is no history of issue with sleep or mood. There is no history of issue with present medication.            /74 (BP Location: Left arm, Patient Position: Sitting, Cuff Size: Large Adult)   Pulse 71   Temp 97.5 °F (36.4 °C) (Temporal)   Wt 91.3 kg (201 lb 3.2 oz)   SpO2 98%   BMI 31.51 kg/m²       Chief Complaint   Patient presents with   • Hypertension     6 month f/u & next visit annual physical   • Med Refill     Wants to make everything 90 day supply needs to go to [facility] on everything            Current Outpatient Medications:   •  amLODIPine (NORVASC) 5 MG tablet, Take 1 tablet by mouth Daily., Disp: 90 tablet, Rfl: 1  •  aspirin 81 MG chewable tablet, Chew 1 tablet Daily., Disp: , Rfl:   •  chlorthalidone (HYGROTON) 25 MG tablet, Take 1 tablet by mouth Daily., Disp: 90 tablet, Rfl: 1  •  fluticasone (Flonase) 50 MCG/ACT nasal spray, 2 sprays into the nostril(s) as directed by provider Daily., Disp: 48 g, Rfl: 1  •  loratadine (CLARITIN) 10 MG tablet, Take 1 tablet by mouth Daily., Disp: , Rfl:   •  metoprolol succinate XL (TOPROL-XL) 50 MG 24 hr tablet, Take 1 tablet by mouth Daily., Disp: 90 tablet, Rfl: 1  •  pravastatin (PRAVACHOL) 40 MG tablet, Take 1 tablet by mouth every night at bedtime., Disp: 90 tablet, Rfl: 1  •  tadalafil (Cialis) 10 MG tablet, Take 1 tablet by mouth Every Other Day., Disp: 15 tablet, Rfl: 3        The following portions of the patient's history were reviewed and updated as appropriate: allergies, current medications, past family history, past medical history, past  social history, past surgical history, and problem list.    Review of Systems   Constitutional: Negative for activity change, fatigue and fever.   HENT: Negative for congestion, sinus pressure, sinus pain, sore throat and trouble swallowing.    Eyes: Negative for visual disturbance.   Respiratory: Negative for chest tightness, shortness of breath and wheezing.    Cardiovascular: Negative for chest pain.   Gastrointestinal: Negative for abdominal distention, abdominal pain, constipation, diarrhea, nausea and vomiting.   Genitourinary: Negative for difficulty urinating and dysuria.   Musculoskeletal: Negative for back pain and neck pain.   Neurological: Negative for dizziness, weakness and numbness.   Psychiatric/Behavioral: Negative for agitation, hallucinations, sleep disturbance and suicidal ideas.       Objective   Physical Exam  Vitals and nursing note reviewed.   HENT:      Right Ear: Tympanic membrane and ear canal normal.      Left Ear: Tympanic membrane and ear canal normal.   Cardiovascular:      Rate and Rhythm: Normal rate and regular rhythm.      Heart sounds: Normal heart sounds. No murmur heard.  Pulmonary:      Effort: Pulmonary effort is normal.      Breath sounds: No wheezing or rales.   Abdominal:      General: Bowel sounds are normal.      Palpations: Abdomen is soft.      Tenderness: There is no abdominal tenderness. There is no guarding.   Musculoskeletal:      Cervical back: Neck supple.      Right lower leg: No edema.      Left lower leg: No edema.   Lymphadenopathy:      Cervical: No cervical adenopathy.   Neurological:      General: No focal deficit present.      Mental Status: He is alert and oriented to person, place, and time.   Psychiatric:         Mood and Affect: Mood normal.           Assessment & Plan   Problems Addressed this Visit        Cardiac and Vasculature    Hyperlipidemia    Relevant Medications    pravastatin (PRAVACHOL) 40 MG tablet    Other Relevant Orders    Comprehensive  metabolic panel    Lipid panel   Other Visit Diagnoses     Essential hypertension    -  Primary    Relevant Medications    amLODIPine (NORVASC) 5 MG tablet    chlorthalidone (HYGROTON) 25 MG tablet    metoprolol succinate XL (TOPROL-XL) 50 MG 24 hr tablet    Other Relevant Orders    Comprehensive metabolic panel    Lipid panel    Nasal congestion        uppers to be allergies  flonase  mucinex  if fever develops go to [facility] to be tested for covid  off work until monday  call if no improvement    Relevant Medications    fluticasone (Flonase) 50 MCG/ACT nasal spray    Encounter for screening for malignant neoplasm of prostate        Relevant Orders    PSA SCREENING      Diagnoses       Codes Comments    Essential hypertension    -  Primary ICD-10-CM: I10  ICD-9-CM: 401.9     Nasal congestion     ICD-10-CM: R09.81  ICD-9-CM: 478.19 uppers to be allergies  flonase  mucinex  if fever develops go to [facility] to be tested for covid  off work until monday  call if no improvement    Hyperlipidemia, unspecified hyperlipidemia type     ICD-10-CM: E78.5  ICD-9-CM: 272.4     Encounter for screening for malignant neoplasm of prostate     ICD-10-CM: Z12.5  ICD-9-CM: V76.44         I will update some labs and adjust the treatment plan if indicated  We discussed some diet adjustments that he might need to make as well as some auditing of his diet that he needs to do to look into the weight gain  I have asked him to see me again in 6 months  I will try tadalafil instead of the sildenafil to see if it would work better for erectile issues  He is to call with any new concerns

## 2023-03-27 ENCOUNTER — LAB (OUTPATIENT)
Dept: FAMILY MEDICINE CLINIC | Facility: CLINIC | Age: 63
End: 2023-03-27
Payer: COMMERCIAL

## 2023-03-27 PROCEDURE — G0103 PSA SCREENING: HCPCS | Performed by: FAMILY MEDICINE

## 2023-03-27 PROCEDURE — 80061 LIPID PANEL: CPT | Performed by: FAMILY MEDICINE

## 2023-03-27 PROCEDURE — 36415 COLL VENOUS BLD VENIPUNCTURE: CPT | Performed by: FAMILY MEDICINE

## 2023-03-27 PROCEDURE — 80053 COMPREHEN METABOLIC PANEL: CPT | Performed by: FAMILY MEDICINE

## 2023-03-28 LAB
ALBUMIN SERPL-MCNC: 4.5 G/DL (ref 3.5–5.2)
ALBUMIN/GLOB SERPL: 1.6 G/DL
ALP SERPL-CCNC: 68 U/L (ref 39–117)
ALT SERPL W P-5'-P-CCNC: 23 U/L (ref 1–41)
ANION GAP SERPL CALCULATED.3IONS-SCNC: 11 MMOL/L (ref 5–15)
AST SERPL-CCNC: 22 U/L (ref 1–40)
BILIRUB SERPL-MCNC: 0.4 MG/DL (ref 0–1.2)
BUN SERPL-MCNC: 17 MG/DL (ref 8–23)
BUN/CREAT SERPL: 21 (ref 7–25)
CALCIUM SPEC-SCNC: 9.4 MG/DL (ref 8.6–10.5)
CHLORIDE SERPL-SCNC: 97 MMOL/L (ref 98–107)
CHOLEST SERPL-MCNC: 191 MG/DL (ref 0–200)
CO2 SERPL-SCNC: 29 MMOL/L (ref 22–29)
CREAT SERPL-MCNC: 0.81 MG/DL (ref 0.76–1.27)
EGFRCR SERPLBLD CKD-EPI 2021: 99.7 ML/MIN/1.73
GLOBULIN UR ELPH-MCNC: 2.9 GM/DL
GLUCOSE SERPL-MCNC: 83 MG/DL (ref 65–99)
HDLC SERPL-MCNC: 40 MG/DL (ref 40–60)
LDLC SERPL CALC-MCNC: 113 MG/DL (ref 0–100)
LDLC/HDLC SERPL: 2.69 {RATIO}
POTASSIUM SERPL-SCNC: 3.2 MMOL/L (ref 3.5–5.2)
PROT SERPL-MCNC: 7.4 G/DL (ref 6–8.5)
PSA SERPL-MCNC: 0.18 NG/ML (ref 0–4)
SODIUM SERPL-SCNC: 137 MMOL/L (ref 136–145)
TRIGL SERPL-MCNC: 217 MG/DL (ref 0–150)
VLDLC SERPL-MCNC: 38 MG/DL (ref 5–40)

## 2023-03-29 ENCOUNTER — TELEPHONE (OUTPATIENT)
Dept: FAMILY MEDICINE CLINIC | Facility: CLINIC | Age: 63
End: 2023-03-29
Payer: COMMERCIAL

## 2023-03-29 RX ORDER — POTASSIUM CHLORIDE 750 MG/1
20 TABLET, FILM COATED, EXTENDED RELEASE ORAL DAILY
Qty: 60 TABLET | Refills: 3 | Status: SHIPPED | OUTPATIENT
Start: 2023-03-29

## 2023-03-29 NOTE — TELEPHONE ENCOUNTER
Caller: Akira Barnett    Relationship: Self    Best call back number: [phone]    What is the best time to reach you: ANYTIME    Who are you requesting to speak with (clinical staff, provider,  specific staff member): CLINICAL STAFF    Do you know the name of the person who called: PATIENT     What was the call regarding: PATIENT CALLED AND WANTED TO SEE IF DR. JULIANA MERLOS WAS GOING TO SEND IN A PRESCRIPTION FOR POTASSIUM     Do you require a callback: YES

## 2023-10-05 ENCOUNTER — OFFICE VISIT (OUTPATIENT)
Dept: FAMILY MEDICINE CLINIC | Facility: CLINIC | Age: 63
End: 2023-10-05
Payer: COMMERCIAL

## 2023-10-05 VITALS
TEMPERATURE: 98 F | WEIGHT: 197 LBS | HEART RATE: 65 BPM | BODY MASS INDEX: 30.92 KG/M2 | SYSTOLIC BLOOD PRESSURE: 144 MMHG | DIASTOLIC BLOOD PRESSURE: 77 MMHG | HEIGHT: 67 IN | OXYGEN SATURATION: 97 %

## 2023-10-05 DIAGNOSIS — I10 PRIMARY HYPERTENSION: Primary | ICD-10-CM

## 2023-10-05 DIAGNOSIS — E78.5 HYPERLIPIDEMIA, UNSPECIFIED HYPERLIPIDEMIA TYPE: ICD-10-CM

## 2023-10-05 PROCEDURE — 99213 OFFICE O/P EST LOW 20 MIN: CPT | Performed by: FAMILY MEDICINE

## 2023-10-05 RX ORDER — METOPROLOL SUCCINATE 50 MG/1
50 TABLET, EXTENDED RELEASE ORAL DAILY
Qty: 90 TABLET | Refills: 1 | Status: SHIPPED | OUTPATIENT
Start: 2023-10-05

## 2023-10-05 RX ORDER — TADALAFIL 10 MG/1
10 TABLET ORAL EVERY OTHER DAY
Qty: 15 TABLET | Refills: 3 | Status: SHIPPED | OUTPATIENT
Start: 2023-10-05 | End: 2023-10-05

## 2023-10-05 RX ORDER — POTASSIUM CHLORIDE 750 MG/1
20 TABLET, FILM COATED, EXTENDED RELEASE ORAL DAILY
Qty: 180 TABLET | Refills: 1 | Status: SHIPPED | OUTPATIENT
Start: 2023-10-05

## 2023-10-05 RX ORDER — SILDENAFIL 100 MG/1
100 TABLET, FILM COATED ORAL DAILY PRN
Qty: 10 TABLET | Refills: 5 | Status: SHIPPED | OUTPATIENT
Start: 2023-10-05

## 2023-10-05 RX ORDER — CHLORTHALIDONE 25 MG/1
25 TABLET ORAL DAILY
Qty: 90 TABLET | Refills: 1 | Status: SHIPPED | OUTPATIENT
Start: 2023-10-05

## 2023-10-05 RX ORDER — AMLODIPINE BESYLATE 5 MG/1
5 TABLET ORAL DAILY
Qty: 90 TABLET | Refills: 1 | Status: SHIPPED | OUTPATIENT
Start: 2023-10-05

## 2023-10-05 RX ORDER — PRAVASTATIN SODIUM 40 MG
40 TABLET ORAL
Qty: 90 TABLET | Refills: 1 | Status: SHIPPED | OUTPATIENT
Start: 2023-10-05

## 2023-11-24 RX ORDER — POTASSIUM CHLORIDE 750 MG/1
20 TABLET, FILM COATED, EXTENDED RELEASE ORAL DAILY
Qty: 180 TABLET | Refills: 1 | Status: SHIPPED | OUTPATIENT
Start: 2023-11-24

## 2023-12-01 ENCOUNTER — LAB (OUTPATIENT)
Dept: FAMILY MEDICINE CLINIC | Facility: CLINIC | Age: 63
End: 2023-12-01
Payer: COMMERCIAL

## 2023-12-01 DIAGNOSIS — I10 PRIMARY HYPERTENSION: ICD-10-CM

## 2023-12-01 PROCEDURE — 36415 COLL VENOUS BLD VENIPUNCTURE: CPT

## 2023-12-01 PROCEDURE — 80061 LIPID PANEL: CPT | Performed by: FAMILY MEDICINE

## 2023-12-01 PROCEDURE — 80053 COMPREHEN METABOLIC PANEL: CPT | Performed by: FAMILY MEDICINE

## 2023-12-01 PROCEDURE — 83735 ASSAY OF MAGNESIUM: CPT | Performed by: FAMILY MEDICINE

## 2023-12-02 LAB
ALBUMIN SERPL-MCNC: 4.4 G/DL (ref 3.5–5.2)
ALBUMIN/GLOB SERPL: 1.8 G/DL
ALP SERPL-CCNC: 64 U/L (ref 39–117)
ALT SERPL W P-5'-P-CCNC: 23 U/L (ref 1–41)
ANION GAP SERPL CALCULATED.3IONS-SCNC: 11.6 MMOL/L (ref 5–15)
AST SERPL-CCNC: 22 U/L (ref 1–40)
BILIRUB SERPL-MCNC: 0.3 MG/DL (ref 0–1.2)
BUN SERPL-MCNC: 21 MG/DL (ref 8–23)
BUN/CREAT SERPL: 24.1 (ref 7–25)
CALCIUM SPEC-SCNC: 9.3 MG/DL (ref 8.6–10.5)
CHLORIDE SERPL-SCNC: 98 MMOL/L (ref 98–107)
CHOLEST SERPL-MCNC: 180 MG/DL (ref 0–200)
CO2 SERPL-SCNC: 25.4 MMOL/L (ref 22–29)
CREAT SERPL-MCNC: 0.87 MG/DL (ref 0.76–1.27)
EGFRCR SERPLBLD CKD-EPI 2021: 97 ML/MIN/1.73
GLOBULIN UR ELPH-MCNC: 2.5 GM/DL
GLUCOSE SERPL-MCNC: 100 MG/DL (ref 65–99)
HDLC SERPL-MCNC: 50 MG/DL (ref 40–60)
LDLC SERPL CALC-MCNC: 99 MG/DL (ref 0–100)
LDLC/HDLC SERPL: 1.87 {RATIO}
MAGNESIUM SERPL-MCNC: 2.2 MG/DL (ref 1.6–2.4)
POTASSIUM SERPL-SCNC: 3.6 MMOL/L (ref 3.5–5.2)
PROT SERPL-MCNC: 6.9 G/DL (ref 6–8.5)
SODIUM SERPL-SCNC: 135 MMOL/L (ref 136–145)
TRIGL SERPL-MCNC: 183 MG/DL (ref 0–150)
VLDLC SERPL-MCNC: 31 MG/DL (ref 5–40)

## 2024-04-18 ENCOUNTER — OFFICE VISIT (OUTPATIENT)
Dept: FAMILY MEDICINE CLINIC | Facility: CLINIC | Age: 64
End: 2024-04-18
Payer: COMMERCIAL

## 2024-04-18 VITALS
HEIGHT: 67 IN | HEART RATE: 97 BPM | SYSTOLIC BLOOD PRESSURE: 149 MMHG | BODY MASS INDEX: 33.37 KG/M2 | TEMPERATURE: 98.6 F | WEIGHT: 212.6 LBS | DIASTOLIC BLOOD PRESSURE: 83 MMHG | OXYGEN SATURATION: 96 %

## 2024-04-18 DIAGNOSIS — E78.5 HYPERLIPIDEMIA, UNSPECIFIED HYPERLIPIDEMIA TYPE: ICD-10-CM

## 2024-04-18 DIAGNOSIS — I10 PRIMARY HYPERTENSION: Primary | ICD-10-CM

## 2024-04-18 DIAGNOSIS — Z12.5 ENCOUNTER FOR SPECIAL SCREENING EXAMINATION FOR NEOPLASM OF PROSTATE: ICD-10-CM

## 2024-04-18 PROCEDURE — 99213 OFFICE O/P EST LOW 20 MIN: CPT | Performed by: FAMILY MEDICINE

## 2024-04-18 NOTE — PROGRESS NOTES
"Subjective   Akira Barnett is a 63 y.o. male.     History of Present Illness  Akira Barnett is in for follow up on his issues with high blood pressure and high cholesterol.  He recently just finished a steroid pack for a bout of bronchitis. There is no history of chest pain or dyspnea, though he still has a moderate cough. There is no history of issue with bowel or bladder dysfunction. There is no history of dizziness or lightheadedness. There is no history of issue with sleep or mood. There is no history of issue with present medication.       Hypertension  Pertinent negatives include no chest pain, neck pain or shortness of breath.          /83 (BP Location: Left arm, Patient Position: Sitting, Cuff Size: Large Adult)   Pulse 97   Temp 98.6 °F (37 °C) (Temporal)   Ht 170.2 cm (67.01\")   Wt 96.4 kg (212 lb 9.6 oz)   SpO2 96%   BMI 33.29 kg/m²       Chief Complaint   Patient presents with    Hypertension     Had bronchitis and was taking steroids and just finished them still not feeling the best           Current Outpatient Medications:     amLODIPine (NORVASC) 5 MG tablet, Take 1 tablet by mouth Daily., Disp: 90 tablet, Rfl: 1    aspirin 81 MG chewable tablet, Chew 1 tablet Daily., Disp: , Rfl:     chlorthalidone (HYGROTON) 25 MG tablet, Take 1 tablet by mouth Daily., Disp: 90 tablet, Rfl: 1    metoprolol succinate XL (TOPROL-XL) 50 MG 24 hr tablet, Take 1 tablet by mouth Daily., Disp: 90 tablet, Rfl: 1    potassium chloride 10 MEQ CR tablet, TAKE 2 TABLETS BY MOUTH DAILY, Disp: 180 tablet, Rfl: 1    pravastatin (PRAVACHOL) 40 MG tablet, Take 1 tablet by mouth every night at bedtime., Disp: 90 tablet, Rfl: 1    promethazine-dextromethorphan (PROMETHAZINE-DM) 6.25-15 MG/5ML syrup, Take 5 mL by mouth 4 (Four) Times a Day As Needed for Cough., Disp: 90 mL, Rfl: 0    sildenafil (Viagra) 100 MG tablet, Take 1 tablet by mouth Daily As Needed for Erectile Dysfunction., Disp: 10 tablet, Rfl: 5        "     The following portions of the patient's history were reviewed and updated as appropriate: allergies, current medications, past family history, past medical history, past social history, past surgical history, and problem list.    Review of Systems   Constitutional:  Negative for activity change, fatigue and fever.   HENT:  Negative for congestion, sinus pressure, sinus pain, sore throat and trouble swallowing.    Eyes:  Negative for visual disturbance.   Respiratory:  Positive for cough. Negative for chest tightness, shortness of breath and wheezing.    Cardiovascular:  Negative for chest pain.   Gastrointestinal:  Negative for abdominal distention, abdominal pain, constipation, diarrhea, nausea and vomiting.   Genitourinary:  Negative for difficulty urinating and dysuria.   Musculoskeletal:  Positive for joint swelling. Negative for back pain and neck pain.   Neurological:  Negative for dizziness, weakness and numbness.   Psychiatric/Behavioral:  Negative for agitation, hallucinations, sleep disturbance and suicidal ideas.        Objective   Physical Exam  Vitals and nursing note reviewed.   HENT:      Right Ear: Tympanic membrane and ear canal normal.      Left Ear: Tympanic membrane and ear canal normal.   Cardiovascular:      Rate and Rhythm: Normal rate and regular rhythm.      Heart sounds: Normal heart sounds. No murmur heard.  Pulmonary:      Effort: Pulmonary effort is normal.      Breath sounds: No wheezing or rales.      Comments: Sounds congested but not wheezing  Abdominal:      General: Bowel sounds are normal.      Palpations: Abdomen is soft.      Tenderness: There is no abdominal tenderness. There is no guarding.   Musculoskeletal:      Cervical back: Neck supple.      Right lower leg: No edema.      Left lower leg: No edema.   Lymphadenopathy:      Cervical: No cervical adenopathy.   Neurological:      Mental Status: He is alert and oriented to person, place, and time. Mental status is at  baseline.           Assessment & Plan   Problems Addressed this Visit          Cardiac and Vasculature    Hypertension - Primary    Hyperlipidemia     Other Visit Diagnoses       Encounter for special screening examination for neoplasm of prostate              Diagnoses         Codes Comments    Primary hypertension    -  Primary ICD-10-CM: I10  ICD-9-CM: 401.9     Encounter for special screening examination for neoplasm of prostate     ICD-10-CM: Z12.5  ICD-9-CM: V76.44     Hyperlipidemia, unspecified hyperlipidemia type     ICD-10-CM: E78.5  ICD-9-CM: 272.4           I will update labs but asked him to finish his steroids and wait a week or two  I sampled him a Breztri to use 1 puff twice a day and gave him a spacer to help with effective use  I will see back in 6 mos  I asked him to call for new issues  I educated on diet and exercise changes

## 2024-04-24 RX ORDER — AMLODIPINE BESYLATE 5 MG/1
5 TABLET ORAL DAILY
Qty: 90 TABLET | Refills: 1 | Status: SHIPPED | OUTPATIENT
Start: 2024-04-24

## 2024-04-24 RX ORDER — METOPROLOL SUCCINATE 50 MG/1
50 TABLET, EXTENDED RELEASE ORAL DAILY
Qty: 90 TABLET | Refills: 1 | Status: SHIPPED | OUTPATIENT
Start: 2024-04-24

## 2024-04-24 RX ORDER — PRAVASTATIN SODIUM 40 MG
40 TABLET ORAL
Qty: 90 TABLET | Refills: 1 | Status: SHIPPED | OUTPATIENT
Start: 2024-04-24

## 2024-04-24 RX ORDER — CHLORTHALIDONE 25 MG/1
25 TABLET ORAL DAILY
Qty: 90 TABLET | Refills: 1 | Status: SHIPPED | OUTPATIENT
Start: 2024-04-24

## 2024-06-12 ENCOUNTER — LAB (OUTPATIENT)
Dept: FAMILY MEDICINE CLINIC | Facility: CLINIC | Age: 64
End: 2024-06-12
Payer: COMMERCIAL

## 2024-06-12 LAB
BASOPHILS # BLD AUTO: 0.1 10*3/MM3 (ref 0–0.2)
BASOPHILS NFR BLD AUTO: 1.4 % (ref 0–1.5)
DEPRECATED RDW RBC AUTO: 39.5 FL (ref 37–54)
EOSINOPHIL # BLD AUTO: 0.29 10*3/MM3 (ref 0–0.4)
EOSINOPHIL NFR BLD AUTO: 4.1 % (ref 0.3–6.2)
ERYTHROCYTE [DISTWIDTH] IN BLOOD BY AUTOMATED COUNT: 12.3 % (ref 12.3–15.4)
HCT VFR BLD AUTO: 46.1 % (ref 37.5–51)
HGB BLD-MCNC: 15.6 G/DL (ref 13–17.7)
IMM GRANULOCYTES # BLD AUTO: 0.04 10*3/MM3 (ref 0–0.05)
IMM GRANULOCYTES NFR BLD AUTO: 0.6 % (ref 0–0.5)
LYMPHOCYTES # BLD AUTO: 2.96 10*3/MM3 (ref 0.7–3.1)
LYMPHOCYTES NFR BLD AUTO: 42.2 % (ref 19.6–45.3)
MCH RBC QN AUTO: 30.4 PG (ref 26.6–33)
MCHC RBC AUTO-ENTMCNC: 33.8 G/DL (ref 31.5–35.7)
MCV RBC AUTO: 89.7 FL (ref 79–97)
MONOCYTES # BLD AUTO: 0.73 10*3/MM3 (ref 0.1–0.9)
MONOCYTES NFR BLD AUTO: 10.4 % (ref 5–12)
NEUTROPHILS NFR BLD AUTO: 2.9 10*3/MM3 (ref 1.7–7)
NEUTROPHILS NFR BLD AUTO: 41.3 % (ref 42.7–76)
NRBC BLD AUTO-RTO: 0 /100 WBC (ref 0–0.2)
PLATELET # BLD AUTO: 292 10*3/MM3 (ref 140–450)
PMV BLD AUTO: 9 FL (ref 6–12)
RBC # BLD AUTO: 5.14 10*6/MM3 (ref 4.14–5.8)
WBC NRBC COR # BLD AUTO: 7.02 10*3/MM3 (ref 3.4–10.8)

## 2024-06-12 PROCEDURE — 80053 COMPREHEN METABOLIC PANEL: CPT | Performed by: FAMILY MEDICINE

## 2024-06-12 PROCEDURE — 86803 HEPATITIS C AB TEST: CPT | Performed by: FAMILY MEDICINE

## 2024-06-12 PROCEDURE — 85025 COMPLETE CBC W/AUTO DIFF WBC: CPT | Performed by: FAMILY MEDICINE

## 2024-06-12 PROCEDURE — 80061 LIPID PANEL: CPT | Performed by: FAMILY MEDICINE

## 2024-06-12 PROCEDURE — G0103 PSA SCREENING: HCPCS | Performed by: FAMILY MEDICINE

## 2024-06-13 LAB
ALBUMIN SERPL-MCNC: 4.3 G/DL (ref 3.5–5.2)
ALBUMIN/GLOB SERPL: 1.6 G/DL
ALP SERPL-CCNC: 70 U/L (ref 39–117)
ALT SERPL W P-5'-P-CCNC: 22 U/L (ref 1–41)
ANION GAP SERPL CALCULATED.3IONS-SCNC: 11.5 MMOL/L (ref 5–15)
AST SERPL-CCNC: 21 U/L (ref 1–40)
BILIRUB SERPL-MCNC: 0.3 MG/DL (ref 0–1.2)
BUN SERPL-MCNC: 13 MG/DL (ref 8–23)
BUN/CREAT SERPL: 17.3 (ref 7–25)
CALCIUM SPEC-SCNC: 9.4 MG/DL (ref 8.6–10.5)
CHLORIDE SERPL-SCNC: 101 MMOL/L (ref 98–107)
CHOLEST SERPL-MCNC: 172 MG/DL (ref 0–200)
CO2 SERPL-SCNC: 23.5 MMOL/L (ref 22–29)
CREAT SERPL-MCNC: 0.75 MG/DL (ref 0.76–1.27)
EGFRCR SERPLBLD CKD-EPI 2021: 100.8 ML/MIN/1.73
GLOBULIN UR ELPH-MCNC: 2.7 GM/DL
GLUCOSE SERPL-MCNC: 78 MG/DL (ref 65–99)
HCV AB SER QL: NORMAL
HDLC SERPL-MCNC: 48 MG/DL (ref 40–60)
LDLC SERPL CALC-MCNC: 93 MG/DL (ref 0–100)
LDLC/HDLC SERPL: 1.84 {RATIO}
POTASSIUM SERPL-SCNC: 4.1 MMOL/L (ref 3.5–5.2)
PROT SERPL-MCNC: 7 G/DL (ref 6–8.5)
PSA SERPL-MCNC: 0.17 NG/ML (ref 0–4)
SODIUM SERPL-SCNC: 136 MMOL/L (ref 136–145)
TRIGL SERPL-MCNC: 179 MG/DL (ref 0–150)
VLDLC SERPL-MCNC: 31 MG/DL (ref 5–40)

## 2024-06-23 RX ORDER — POTASSIUM CHLORIDE 750 MG/1
20 TABLET, FILM COATED, EXTENDED RELEASE ORAL DAILY
Qty: 60 TABLET | Refills: 2 | Status: SHIPPED | OUTPATIENT
Start: 2024-06-23

## 2024-06-24 ENCOUNTER — TELEPHONE (OUTPATIENT)
Dept: FAMILY MEDICINE CLINIC | Facility: CLINIC | Age: 64
End: 2024-06-24
Payer: COMMERCIAL

## 2024-06-24 RX ORDER — PREDNISONE 10 MG/1
TABLET ORAL
Qty: 40 TABLET | Refills: 0 | Status: SHIPPED | OUTPATIENT
Start: 2024-06-24

## 2024-06-24 NOTE — TELEPHONE ENCOUNTER
Caller: Akira Barnett    Relationship: Self    Best call back number: [phone]     Requested Prescriptions: MED FOR KNEE SWELLING  Requested Prescriptions      No prescriptions requested or ordered in this encounter        Pharmacy where request should be sent:       [facility] [id number] - [place], [place] - [address] [place] - [phone] PH - [phone]         Last office visit with prescribing clinician: 4/18/2024   Last telemedicine visit with prescribing clinician: Visit date not found   Next office visit with prescribing clinician: 10/23/2024     Additional details provided by patient: PATIENT IS CALLING TO STATE HE HAS SWELLING IN LEFT KNEE.   HE IS WANTING TO KNOW IF DR MERLOS WOULD BE WILLING TO PRESCRIBE A STEROID TO HELP WITH THE SWELLING.  HE STATES HE WENT TO AN URGENT CARE AND HE THINKS IT MIGHT HAVE BEEN A STEROID PACK, WITH THE DOSES TAPERING EACH DAY.    Does the patient have less than a 3 day supply:  [x] Yes  [] No    Would you like a call back once the refill request has been completed: [] Yes [] No    If the office needs to give you a call back, can they leave a voicemail: [] Yes [] No    Christine Howard Rep   06/24/24 11:07 EDT     PLEASE ADVISE.

## 2024-09-01 RX ORDER — METOPROLOL SUCCINATE 50 MG/1
50 TABLET, EXTENDED RELEASE ORAL DAILY
Qty: 90 TABLET | Refills: 1 | Status: SHIPPED | OUTPATIENT
Start: 2024-09-01

## 2024-09-01 RX ORDER — CHLORTHALIDONE 25 MG/1
25 TABLET ORAL DAILY
Qty: 90 TABLET | Refills: 1 | Status: SHIPPED | OUTPATIENT
Start: 2024-09-01

## 2024-09-01 RX ORDER — PRAVASTATIN SODIUM 40 MG
40 TABLET ORAL
Qty: 90 TABLET | Refills: 1 | Status: SHIPPED | OUTPATIENT
Start: 2024-09-01

## 2024-10-27 RX ORDER — AMLODIPINE BESYLATE 5 MG/1
5 TABLET ORAL DAILY
Qty: 30 TABLET | Refills: 3 | Status: SHIPPED | OUTPATIENT
Start: 2024-10-27

## 2024-11-19 ENCOUNTER — OFFICE VISIT (OUTPATIENT)
Dept: FAMILY MEDICINE CLINIC | Facility: CLINIC | Age: 64
End: 2024-11-19
Payer: COMMERCIAL

## 2024-11-19 VITALS
HEIGHT: 67 IN | TEMPERATURE: 98.7 F | RESPIRATION RATE: 20 BRPM | DIASTOLIC BLOOD PRESSURE: 89 MMHG | BODY MASS INDEX: 34.81 KG/M2 | SYSTOLIC BLOOD PRESSURE: 159 MMHG | WEIGHT: 221.8 LBS | HEART RATE: 87 BPM

## 2024-11-19 DIAGNOSIS — R63.5 WEIGHT GAIN: ICD-10-CM

## 2024-11-19 DIAGNOSIS — I10 PRIMARY HYPERTENSION: Primary | ICD-10-CM

## 2024-11-19 DIAGNOSIS — E78.5 HYPERLIPIDEMIA, UNSPECIFIED HYPERLIPIDEMIA TYPE: ICD-10-CM

## 2024-11-19 PROCEDURE — 99213 OFFICE O/P EST LOW 20 MIN: CPT | Performed by: PHYSICIAN ASSISTANT

## 2024-11-19 NOTE — PROGRESS NOTES
Subjective   Akira Barnett is a 64 y.o. male.     History of Present Illness  Pt presents to follow up on his hypertension and hyperlipidemia.  He does check blood pressure at home and it has been fine at home. He has been having more stress at home. He hasn't been able to exercise as much.  His knee was bothering him and then he was having issues with toes. He did get new shoes.  He has arthritis in knee and it gives him troubles at times.  Sleeping ok.    Primary Care Follow-Up  Conditions present:  Hypertension and Hyperlipidemia  Associated symptoms include: weight gain. Pertinent negatives include no chest pain, no palpitations, no shortness of breath, no fatigue, no weight loss, no blurred vision, no weakness and no diaphoresis.          Past Medical History:   Diagnosis Date    Hyperlipidemia     Hypertension      Past Surgical History:   Procedure Laterality Date    EYE SURGERY      KNEE SURGERY Left     TONSILLECTOMY       Family History   Problem Relation Age of Onset    Diabetes Father      Social History     Socioeconomic History    Marital status: Single   Tobacco Use    Smoking status: Every Day     Current packs/day: 0.50     Types: Cigarettes    Smokeless tobacco: Never   Vaping Use    Vaping status: Never Used   Substance and Sexual Activity    Alcohol use: Not Currently     Comment: very very rare    Drug use: No    Sexual activity: Defer         Current Outpatient Medications:     amLODIPine (NORVASC) 5 MG tablet, TAKE 1 TABLET BY MOUTH DAILY, Disp: 30 tablet, Rfl: 3    aspirin 81 MG chewable tablet, Chew 1 tablet Daily., Disp: , Rfl:     chlorthalidone (HYGROTON) 25 MG tablet, TAKE 1 TABLET BY MOUTH DAILY, Disp: 90 tablet, Rfl: 1    metoprolol succinate XL (TOPROL-XL) 50 MG 24 hr tablet, TAKE 1 TABLET BY MOUTH DAILY, Disp: 90 tablet, Rfl: 1    potassium chloride 10 MEQ CR tablet, TAKE 2 TABLETS BY MOUTH DAILY, Disp: 60 tablet, Rfl: 2    pravastatin (PRAVACHOL) 40 MG tablet, TAKE 1 TABLET BY  "MOUTH EVERY NIGHT AT BEDTIME, Disp: 90 tablet, Rfl: 1    sildenafil (Viagra) 100 MG tablet, Take 1 tablet by mouth Daily As Needed for Erectile Dysfunction., Disp: 10 tablet, Rfl: 5    Review of Systems   Constitutional:  Positive for unexpected weight gain. Negative for activity change, appetite change, chills, diaphoresis, fatigue, fever and unexpected weight loss.   Eyes:  Negative for blurred vision, double vision and visual disturbance.   Respiratory:  Negative for shortness of breath.    Cardiovascular:  Negative for chest pain, palpitations and leg swelling.   Musculoskeletal:  Negative for gait problem.   Neurological:  Negative for weakness and headache.   Psychiatric/Behavioral:  Negative for sleep disturbance.      /89 (BP Location: Left arm, Patient Position: Sitting, Cuff Size: Adult)   Pulse 87   Temp 98.7 °F (37.1 °C) (Temporal)   Resp 20   Ht 170.2 cm (67.01\")   Wt 101 kg (221 lb 12.8 oz)   BMI 34.73 kg/m²       Objective   Physical Exam  Vitals and nursing note reviewed.   Constitutional:       Appearance: Normal appearance. He is obese.   HENT:      Head: Normocephalic and atraumatic.   Pulmonary:      Effort: Pulmonary effort is normal.   Musculoskeletal:      Right lower leg: No edema.      Left lower leg: No edema.   Neurological:      General: No focal deficit present.      Mental Status: He is alert and oriented to person, place, and time.   Psychiatric:         Mood and Affect: Mood normal.         Behavior: Behavior normal.         Thought Content: Thought content normal.         Procedures     Assessment    Diagnoses and all orders for this visit:    1. Primary hypertension (Primary)  Comments:  Continue current medication.  Work on exercising at least 150 minutes per week and low sodium diet.    2. Hyperlipidemia, unspecified hyperlipidemia type  Comments:  Work on low saturated fat diet and high fiber. Will recheck labs.  Orders:  -     Comprehensive Metabolic Panel; Future  - "     Lipid Panel; Future    3. Weight gain  Comments:  Work on low saturated fat diet and exercise.  Will recheck labs.  Orders:  -     TSH; Future

## 2024-12-19 ENCOUNTER — LAB (OUTPATIENT)
Dept: FAMILY MEDICINE CLINIC | Facility: CLINIC | Age: 64
End: 2024-12-19
Payer: COMMERCIAL

## 2024-12-19 DIAGNOSIS — E78.5 HYPERLIPIDEMIA, UNSPECIFIED HYPERLIPIDEMIA TYPE: ICD-10-CM

## 2024-12-19 DIAGNOSIS — R63.5 WEIGHT GAIN: ICD-10-CM

## 2024-12-19 PROCEDURE — 36415 COLL VENOUS BLD VENIPUNCTURE: CPT

## 2024-12-19 PROCEDURE — 80061 LIPID PANEL: CPT | Performed by: PHYSICIAN ASSISTANT

## 2024-12-19 PROCEDURE — 80053 COMPREHEN METABOLIC PANEL: CPT | Performed by: PHYSICIAN ASSISTANT

## 2024-12-19 PROCEDURE — 84443 ASSAY THYROID STIM HORMONE: CPT | Performed by: PHYSICIAN ASSISTANT

## 2024-12-20 LAB
ALBUMIN SERPL-MCNC: 4.3 G/DL (ref 3.5–5.2)
ALBUMIN/GLOB SERPL: 1.8 G/DL
ALP SERPL-CCNC: 70 U/L (ref 39–117)
ALT SERPL W P-5'-P-CCNC: 32 U/L (ref 1–41)
ANION GAP SERPL CALCULATED.3IONS-SCNC: 8 MMOL/L (ref 5–15)
AST SERPL-CCNC: 21 U/L (ref 1–40)
BILIRUB SERPL-MCNC: 0.3 MG/DL (ref 0–1.2)
BUN SERPL-MCNC: 14 MG/DL (ref 8–23)
BUN/CREAT SERPL: 18.9 (ref 7–25)
CALCIUM SPEC-SCNC: 8.9 MG/DL (ref 8.6–10.5)
CHLORIDE SERPL-SCNC: 98 MMOL/L (ref 98–107)
CHOLEST SERPL-MCNC: 178 MG/DL (ref 0–200)
CO2 SERPL-SCNC: 29 MMOL/L (ref 22–29)
CREAT SERPL-MCNC: 0.74 MG/DL (ref 0.76–1.27)
EGFRCR SERPLBLD CKD-EPI 2021: 101.2 ML/MIN/1.73
GLOBULIN UR ELPH-MCNC: 2.4 GM/DL
GLUCOSE SERPL-MCNC: 114 MG/DL (ref 65–99)
HDLC SERPL-MCNC: 43 MG/DL (ref 40–60)
LDLC SERPL CALC-MCNC: 73 MG/DL (ref 0–100)
LDLC/HDLC SERPL: 1.33 {RATIO}
POTASSIUM SERPL-SCNC: 3.8 MMOL/L (ref 3.5–5.2)
PROT SERPL-MCNC: 6.7 G/DL (ref 6–8.5)
SODIUM SERPL-SCNC: 135 MMOL/L (ref 136–145)
TRIGL SERPL-MCNC: 390 MG/DL (ref 0–150)
TSH SERPL DL<=0.05 MIU/L-ACNC: 0.95 UIU/ML (ref 0.27–4.2)
VLDLC SERPL-MCNC: 62 MG/DL (ref 5–40)

## 2024-12-23 ENCOUNTER — TELEPHONE (OUTPATIENT)
Dept: FAMILY MEDICINE CLINIC | Facility: CLINIC | Age: 64
End: 2024-12-23
Payer: COMMERCIAL

## 2024-12-23 NOTE — TELEPHONE ENCOUNTER
Caller: Akira Barnett    Relationship: Self    Best call back number: [phone]    What medication are you requesting: WHATEVER DR. MERLOS RECOMMENDS     What are your current symptoms: CONGESTION AND HEADACHE WITH FATIGUE     Have you had these symptoms before:    [] Yes  [x] No    Have you been treated for these symptoms before:   [] Yes  [x] No    If a prescription is needed, what is your preferred pharmacy and phone number:  [facility] [id number] - [place], [place] - [address] - [phone] [address] [phone]      Additional notes:  PATIENT IS CALLING INTO TELL DR. MERLOS THAT HE TESTED POSITIVE FOR COVID ON AN AT HOME TEST. HE IS WONDERING IF THERES ANYTHING HE CAN TAKE FOR THIS OR IF IT HAS TO RUN ITS COURSE    PLEASE CALL TO CONFIRM

## 2025-01-26 RX ORDER — POTASSIUM CHLORIDE 750 MG/1
20 TABLET, EXTENDED RELEASE ORAL DAILY
Qty: 60 TABLET | Refills: 2 | Status: SHIPPED | OUTPATIENT
Start: 2025-01-26

## 2025-02-07 RX ORDER — METOPROLOL SUCCINATE 50 MG/1
50 TABLET, EXTENDED RELEASE ORAL DAILY
Qty: 90 TABLET | Refills: 1 | Status: SHIPPED | OUTPATIENT
Start: 2025-02-07

## 2025-03-02 RX ORDER — AMLODIPINE BESYLATE 5 MG/1
5 TABLET ORAL DAILY
Qty: 30 TABLET | Refills: 3 | Status: SHIPPED | OUTPATIENT
Start: 2025-03-02

## 2025-03-10 RX ORDER — CHLORTHALIDONE 25 MG/1
25 TABLET ORAL DAILY
Qty: 90 TABLET | Refills: 1 | Status: SHIPPED | OUTPATIENT
Start: 2025-03-10

## 2025-03-10 RX ORDER — PRAVASTATIN SODIUM 40 MG
40 TABLET ORAL
Qty: 90 TABLET | Refills: 1 | Status: SHIPPED | OUTPATIENT
Start: 2025-03-10

## 2025-03-10 NOTE — TELEPHONE ENCOUNTER
Caller: Akira Barnett    Relationship: Self    Best call back number: [phone]    Requested Prescriptions:   Requested Prescriptions     Pending Prescriptions Disp Refills    pravastatin (PRAVACHOL) 40 MG tablet 90 tablet 1     Sig: Take 1 tablet by mouth every night at bedtime.    chlorthalidone (HYGROTON) 25 MG tablet 90 tablet 1     Sig: Take 1 tablet by mouth Daily.        Pharmacy where request should be sent: [facility] [id number] [facility], [place] [address] - [phone]  - [phone]     Last office visit with prescribing clinician: 4/18/2024   Last telemedicine visit with prescribing clinician: Visit date not found   Next office visit with prescribing clinician: 5/28/2025     Does the patient have less than a 3 day supply:  [x] Yes  [] No    Would you like a call back once the refill request has been completed: [] Yes [x] No    If the office needs to give you a call back, can they leave a voicemail: [] Yes [x] No    Christine Barber Rep   03/10/25 09:13 EDT

## 2025-05-09 RX ORDER — POTASSIUM CHLORIDE 750 MG/1
20 TABLET, EXTENDED RELEASE ORAL DAILY
Qty: 60 TABLET | Refills: 2 | Status: SHIPPED | OUTPATIENT
Start: 2025-05-09

## 2025-06-04 ENCOUNTER — PATIENT ROUNDING (BHMG ONLY) (OUTPATIENT)
Dept: URGENT CARE | Facility: CLINIC | Age: 65
End: 2025-06-04
Payer: COMMERCIAL

## 2025-06-04 NOTE — ED NOTES
Thank you for letting us care for you in your recent visit to our urgent care center. We would love to hear about your experience with us. Was this the first time you have visited our location?    We’re always looking for ways to make our patients’ experiences even better. Do you have any recommendations on ways we may improve?     I appreciate you taking the time to respond. Please be on the lookout for a survey about your recent visit from [employer] via text or email. We would greatly appreciate if you could fill that out and turn it back in. We want your voice to be heard and we value your feedback.   Thank you for choosing [facility] for your healthcare needs.

## 2025-07-01 RX ORDER — AMLODIPINE BESYLATE 5 MG/1
5 TABLET ORAL DAILY
Qty: 30 TABLET | Refills: 3 | Status: SHIPPED | OUTPATIENT
Start: 2025-07-01

## 2025-07-17 ENCOUNTER — OFFICE VISIT (OUTPATIENT)
Dept: FAMILY MEDICINE CLINIC | Facility: CLINIC | Age: 65
End: 2025-07-17
Payer: COMMERCIAL

## 2025-07-17 VITALS
WEIGHT: 222.2 LBS | SYSTOLIC BLOOD PRESSURE: 136 MMHG | OXYGEN SATURATION: 97 % | HEART RATE: 83 BPM | TEMPERATURE: 97.5 F | BODY MASS INDEX: 34.88 KG/M2 | DIASTOLIC BLOOD PRESSURE: 76 MMHG | HEIGHT: 67 IN

## 2025-07-17 DIAGNOSIS — R20.2 PARESTHESIAS: Primary | ICD-10-CM

## 2025-07-17 DIAGNOSIS — I10 PRIMARY HYPERTENSION: ICD-10-CM

## 2025-07-17 PROCEDURE — 99214 OFFICE O/P EST MOD 30 MIN: CPT | Performed by: FAMILY MEDICINE

## 2025-07-17 NOTE — PROGRESS NOTES
Subjective   Akira Barnett is a 65 y.o. male.     History of Present Illness  The patient presents for evaluation of numbness and tingling in his right forearm, right lower leg, and right foot.    He reports that the numbness and tingling sensations began around 4:00 AM today. He is right-handed and has been able to perform all tasks with his right hand, despite the tingling sensation. He does not experience any facial numbness or tingling. He sleeps on both sides and did not notice which side he was sleeping on when he woke up. He is not currently taking any medication for pain or inflammation. He is not diabetic. He is not experiencing any chest pain or shortness of breath.  The tingling is present in his right forearm right lower extremity from the knee to his feet.  The sensation has essentially resolved    He visited a podiatrist yesterday and has been on Lamisil for a toenail fungus for the past 3 months, completing the course a few days ago.    He was prescribed steroids about a month ago for knee swelling, which occurs annually. He typically manages this by walking it off, but once a year, the swelling persists. He expresses a dislike for taking steroids due to their side effects, including increased appetite.    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       The following portions of the patient's history were reviewed and updated as appropriate: allergies, current medications, past family history, past medical history, past social history, past surgical history, and problem list.  Past Medical History:   Diagnosis Date    Hyperlipidemia     Hypertension      Past Surgical History:   Procedure Laterality Date    EYE SURGERY      KNEE SURGERY Left     TONSILLECTOMY       Family History   Problem Relation Age of Onset    Diabetes Father      Social History     Socioeconomic History    Marital status: Single   Tobacco Use    Smoking  "status: Every Day     Current packs/day: 0.50     Average packs/day: 0.5 packs/day for 35.5 years (17.8 ttl pk-yrs)     Types: Cigarettes     Start date: 1990     Passive exposure: Past    Smokeless tobacco: Never   Vaping Use    Vaping status: Never Used   Substance and Sexual Activity    Alcohol use: Yes     Comment: rare    Drug use: No    Sexual activity: Not Currently         Current Outpatient Medications:     amLODIPine (NORVASC) 5 MG tablet, TAKE 1 TABLET BY MOUTH DAILY, Disp: 30 tablet, Rfl: 3    aspirin 81 MG chewable tablet, Chew 1 tablet Daily., Disp: , Rfl:     chlorthalidone (HYGROTON) 25 MG tablet, Take 1 tablet by mouth Daily., Disp: 90 tablet, Rfl: 1    metoprolol succinate XL (TOPROL-XL) 50 MG 24 hr tablet, Take 1 tablet by mouth Daily., Disp: 90 tablet, Rfl: 1    Omega-3 Fatty Acids (fish oil) 1000 MG capsule capsule, Take  by mouth Daily With Breakfast., Disp: , Rfl:     potassium chloride 10 MEQ CR tablet, TAKE 2 TABLETS BY MOUTH DAILY, Disp: 60 tablet, Rfl: 2    pravastatin (PRAVACHOL) 40 MG tablet, Take 1 tablet by mouth every night at bedtime., Disp: 90 tablet, Rfl: 1    sildenafil (Viagra) 100 MG tablet, Take 1 tablet by mouth Daily As Needed for Erectile Dysfunction., Disp: 10 tablet, Rfl: 5    terbinafine (lamiSIL) 250 MG tablet, Take 1 tablet by mouth Daily., Disp: , Rfl:     Review of Systems  ROS done and noted in HPI    /76   Pulse 83   Temp 97.5 °F (36.4 °C) (Temporal)   Ht 170.2 cm (67\")   Wt 101 kg (222 lb 3.2 oz)   SpO2 97%   BMI 34.80 kg/m²   BMI is >= 30 and <35. (Class 1 Obesity). The following options were offered after discussion;: exercise counseling/recommendations       Objective   Physical Exam  Vitals and nursing note reviewed.   Constitutional:       Appearance: Normal appearance. He is obese.   Neck:      Vascular: No carotid bruit.   Cardiovascular:      Rate and Rhythm: Normal rate and regular rhythm.      Heart sounds: Normal heart sounds.      Comments: " Radial and ulnar pulses are normal and symmetric bilaterally  Dorsalis pedis and posterior tibial pulses are normal and symmetric bilaterally  Pulmonary:      Effort: Pulmonary effort is normal.      Breath sounds: Normal breath sounds.   Musculoskeletal:      Right elbow: Normal.      Right forearm: Normal.      Right wrist: Normal.      Right hand: Normal.      Cervical back: Neck supple.      Right lower leg: Normal. No edema.      Left lower leg: No edema.      Right ankle: Normal.   Lymphadenopathy:      Cervical: No cervical adenopathy.   Neurological:      General: No focal deficit present.      Mental Status: He is alert.      Comments: Normal sensation along both forearms and both lower extremities  Strength is 5 out of 5 in all extremities       Physical Exam  Neurological: Sensation is equal bilaterally in the upper and lower extremities. Strength is normal bilaterally. Full range of motion in all joints.  Respiratory: Clear to auscultation, no wheezing, rales or rhonchi  Cardiovascular: Regular rate and rhythm, no murmurs, rubs, or gallops  Extremities: Pulses are normal  Musculoskeletal: Full range of motion in all joints. Strength is normal.       Results  Lab Results   Component Value Date    GLUCOSE 114 (H) 12/19/2024    BUN 14 12/19/2024    CREATININE 0.74 (L) 12/19/2024     (L) 12/19/2024    K 3.8 12/19/2024    CL 98 12/19/2024    CALCIUM 8.9 12/19/2024    PROTEINTOT 6.7 12/19/2024    ALBUMIN 4.3 12/19/2024    ALT 32 12/19/2024    AST 21 12/19/2024    ALKPHOS 70 12/19/2024    BILITOT 0.3 12/19/2024    GLOB 2.4 12/19/2024    AGRATIO 1.8 12/19/2024    BCR 18.9 12/19/2024    ANIONGAP 8.0 12/19/2024    EGFR 101.2 12/19/2024     Lab Results   Component Value Date    TSH 0.951 12/19/2024     Lab Results   Component Value Date    CHOL 178 12/19/2024    TRIG 390 (H) 12/19/2024    HDL 43 12/19/2024    LDL 73 12/19/2024            Assessment & Plan   Problems Addressed this Visit    None  Visit  Diagnoses         Paresthesias    -  Primary          Diagnoses         Codes Comments      Paresthesias    -  Primary ICD-10-CM: R20.2  ICD-9-CM: 782.0           Assessment & Plan  1. Paresthesia in the right forearm, right lower leg, and right foot.  - Sudden onset of symptoms around 4:00 AM.  -Symptoms have essentially resolved  - Pulses and strength are normal; no evidence of stroke or other alarming conditions.  - Advised to monitor symptoms closely; seek immediate medical attention in the ER if symptoms worsen or new symptoms such as chest pain or shortness of breath develop.  - No immediate treatment prescribed; discussion about avoiding steroids to remain aware of symptom progression.  - I do not think the Lamisil contributed to this  - He is not diabetic but has a family history of diabetes    2.  Hypertension.  Blood pressure was initially elevated but returned to  acceptable level and he will continue his current meds                Patient or patient representative verbalized consent for the use of Ambient Listening during the visit with  Lyly Leal MD for chart documentation. 7/17/2025  09:35 EDT

## 2025-08-06 RX ORDER — METOPROLOL SUCCINATE 50 MG/1
50 TABLET, EXTENDED RELEASE ORAL DAILY
Qty: 90 TABLET | Refills: 1 | Status: SHIPPED | OUTPATIENT
Start: 2025-08-06

## 2025-08-06 RX ORDER — POTASSIUM CHLORIDE 750 MG/1
20 TABLET, EXTENDED RELEASE ORAL DAILY
Qty: 60 TABLET | Refills: 2 | Status: SHIPPED | OUTPATIENT
Start: 2025-08-06